# Patient Record
Sex: MALE | Race: OTHER | NOT HISPANIC OR LATINO | URBAN - METROPOLITAN AREA
[De-identification: names, ages, dates, MRNs, and addresses within clinical notes are randomized per-mention and may not be internally consistent; named-entity substitution may affect disease eponyms.]

---

## 2019-02-15 ENCOUNTER — INPATIENT (INPATIENT)
Facility: HOSPITAL | Age: 29
LOS: 8 days | Discharge: HOME | End: 2019-02-24
Attending: HOSPITALIST | Admitting: HOSPITALIST
Payer: MEDICAID

## 2019-02-15 VITALS
TEMPERATURE: 103 F | DIASTOLIC BLOOD PRESSURE: 76 MMHG | OXYGEN SATURATION: 100 % | RESPIRATION RATE: 20 BRPM | HEART RATE: 128 BPM | SYSTOLIC BLOOD PRESSURE: 116 MMHG

## 2019-02-15 DIAGNOSIS — R50.9 FEVER, UNSPECIFIED: ICD-10-CM

## 2019-02-15 DIAGNOSIS — R19.7 DIARRHEA, UNSPECIFIED: ICD-10-CM

## 2019-02-15 DIAGNOSIS — Z29.9 ENCOUNTER FOR PROPHYLACTIC MEASURES, UNSPECIFIED: ICD-10-CM

## 2019-02-15 LAB
ALBUMIN SERPL ELPH-MCNC: 3.9 G/DL — SIGNIFICANT CHANGE UP (ref 3.3–5)
ALP SERPL-CCNC: 158 U/L — HIGH (ref 40–120)
ALT FLD-CCNC: 231 U/L — HIGH (ref 4–41)
ANION GAP SERPL CALC-SCNC: 13 MMO/L — SIGNIFICANT CHANGE UP (ref 7–14)
APPEARANCE UR: CLEAR — SIGNIFICANT CHANGE UP
AST SERPL-CCNC: 91 U/L — HIGH (ref 4–40)
B PERT DNA SPEC QL NAA+PROBE: NOT DETECTED — SIGNIFICANT CHANGE UP
BACTERIA # UR AUTO: NEGATIVE — SIGNIFICANT CHANGE UP
BASE EXCESS BLDV CALC-SCNC: -0.5 MMOL/L — SIGNIFICANT CHANGE UP
BASOPHILS # BLD AUTO: 0.01 K/UL — SIGNIFICANT CHANGE UP (ref 0–0.2)
BASOPHILS NFR BLD AUTO: 0.2 % — SIGNIFICANT CHANGE UP (ref 0–2)
BILIRUB SERPL-MCNC: 0.7 MG/DL — SIGNIFICANT CHANGE UP (ref 0.2–1.2)
BILIRUB UR-MCNC: SIGNIFICANT CHANGE UP
BLOOD GAS VENOUS - CREATININE: 0.93 MG/DL — SIGNIFICANT CHANGE UP (ref 0.5–1.3)
BLOOD UR QL VISUAL: SIGNIFICANT CHANGE UP
BUN SERPL-MCNC: 15 MG/DL — SIGNIFICANT CHANGE UP (ref 7–23)
C PNEUM DNA SPEC QL NAA+PROBE: NOT DETECTED — SIGNIFICANT CHANGE UP
CALCIUM SERPL-MCNC: 9.1 MG/DL — SIGNIFICANT CHANGE UP (ref 8.4–10.5)
CHLORIDE BLDV-SCNC: 107 MMOL/L — SIGNIFICANT CHANGE UP (ref 96–108)
CHLORIDE SERPL-SCNC: 98 MMOL/L — SIGNIFICANT CHANGE UP (ref 98–107)
CO2 SERPL-SCNC: 21 MMOL/L — LOW (ref 22–31)
COLOR SPEC: SIGNIFICANT CHANGE UP
CREAT SERPL-MCNC: 1.17 MG/DL — SIGNIFICANT CHANGE UP (ref 0.5–1.3)
EOSINOPHIL # BLD AUTO: 0 K/UL — SIGNIFICANT CHANGE UP (ref 0–0.5)
EOSINOPHIL NFR BLD AUTO: 0 % — SIGNIFICANT CHANGE UP (ref 0–6)
FLUAV H1 2009 PAND RNA SPEC QL NAA+PROBE: NOT DETECTED — SIGNIFICANT CHANGE UP
FLUAV H1 RNA SPEC QL NAA+PROBE: NOT DETECTED — SIGNIFICANT CHANGE UP
FLUAV H3 RNA SPEC QL NAA+PROBE: NOT DETECTED — SIGNIFICANT CHANGE UP
FLUAV SUBTYP SPEC NAA+PROBE: NOT DETECTED — SIGNIFICANT CHANGE UP
FLUBV RNA SPEC QL NAA+PROBE: NOT DETECTED — SIGNIFICANT CHANGE UP
GAS PNL BLDV: 128 MMOL/L — LOW (ref 136–146)
GLUCOSE BLDV-MCNC: 106 — HIGH (ref 70–99)
GLUCOSE SERPL-MCNC: 108 MG/DL — HIGH (ref 70–99)
GLUCOSE UR-MCNC: NEGATIVE — SIGNIFICANT CHANGE UP
HADV DNA SPEC QL NAA+PROBE: NOT DETECTED — SIGNIFICANT CHANGE UP
HCO3 BLDV-SCNC: 23 MMOL/L — SIGNIFICANT CHANGE UP (ref 20–27)
HCOV PNL SPEC NAA+PROBE: SIGNIFICANT CHANGE UP
HCT VFR BLD CALC: 42.9 % — SIGNIFICANT CHANGE UP (ref 39–50)
HCT VFR BLDV CALC: 43.8 % — SIGNIFICANT CHANGE UP (ref 39–51)
HGB BLD-MCNC: 14.2 G/DL — SIGNIFICANT CHANGE UP (ref 13–17)
HGB BLDV-MCNC: 14.3 G/DL — SIGNIFICANT CHANGE UP (ref 13–17)
HIV COMBO RESULT: SIGNIFICANT CHANGE UP
HIV1+2 AB SPEC QL: SIGNIFICANT CHANGE UP
HMPV RNA SPEC QL NAA+PROBE: NOT DETECTED — SIGNIFICANT CHANGE UP
HPIV1 RNA SPEC QL NAA+PROBE: NOT DETECTED — SIGNIFICANT CHANGE UP
HPIV2 RNA SPEC QL NAA+PROBE: NOT DETECTED — SIGNIFICANT CHANGE UP
HPIV3 RNA SPEC QL NAA+PROBE: NOT DETECTED — SIGNIFICANT CHANGE UP
HPIV4 RNA SPEC QL NAA+PROBE: NOT DETECTED — SIGNIFICANT CHANGE UP
IMM GRANULOCYTES NFR BLD AUTO: 0.6 % — SIGNIFICANT CHANGE UP (ref 0–1.5)
KETONES UR-MCNC: NEGATIVE — SIGNIFICANT CHANGE UP
LACTATE BLDV-MCNC: 1.3 MMOL/L — SIGNIFICANT CHANGE UP (ref 0.5–2)
LEUKOCYTE ESTERASE UR-ACNC: NEGATIVE — SIGNIFICANT CHANGE UP
LYMPHOCYTES # BLD AUTO: 0.86 K/UL — LOW (ref 1–3.3)
LYMPHOCYTES # BLD AUTO: 16.2 % — SIGNIFICANT CHANGE UP (ref 13–44)
MCHC RBC-ENTMCNC: 26.4 PG — LOW (ref 27–34)
MCHC RBC-ENTMCNC: 33.1 % — SIGNIFICANT CHANGE UP (ref 32–36)
MCV RBC AUTO: 79.9 FL — LOW (ref 80–100)
MONOCYTES # BLD AUTO: 0.5 K/UL — SIGNIFICANT CHANGE UP (ref 0–0.9)
MONOCYTES NFR BLD AUTO: 9.4 % — SIGNIFICANT CHANGE UP (ref 2–14)
NEUTROPHILS # BLD AUTO: 3.91 K/UL — SIGNIFICANT CHANGE UP (ref 1.8–7.4)
NEUTROPHILS NFR BLD AUTO: 73.6 % — SIGNIFICANT CHANGE UP (ref 43–77)
NITRITE UR-MCNC: NEGATIVE — SIGNIFICANT CHANGE UP
NRBC # FLD: 0 K/UL — LOW (ref 25–125)
PCO2 BLDV: 35 MMHG — LOW (ref 41–51)
PH BLDV: 7.43 PH — SIGNIFICANT CHANGE UP (ref 7.32–7.43)
PH UR: 6.5 — SIGNIFICANT CHANGE UP (ref 5–8)
PLASMODIUM AG BLD QL: SIGNIFICANT CHANGE UP
PLATELET # BLD AUTO: 281 K/UL — SIGNIFICANT CHANGE UP (ref 150–400)
PMV BLD: 11.9 FL — SIGNIFICANT CHANGE UP (ref 7–13)
PO2 BLDV: 31 MMHG — LOW (ref 35–40)
POTASSIUM BLDV-SCNC: 3.5 MMOL/L — SIGNIFICANT CHANGE UP (ref 3.4–4.5)
POTASSIUM SERPL-MCNC: 3.9 MMOL/L — SIGNIFICANT CHANGE UP (ref 3.5–5.3)
POTASSIUM SERPL-SCNC: 3.9 MMOL/L — SIGNIFICANT CHANGE UP (ref 3.5–5.3)
PROT SERPL-MCNC: 7.8 G/DL — SIGNIFICANT CHANGE UP (ref 6–8.3)
PROT UR-MCNC: 100 — HIGH
RBC # BLD: 5.37 M/UL — SIGNIFICANT CHANGE UP (ref 4.2–5.8)
RBC # FLD: 14 % — SIGNIFICANT CHANGE UP (ref 10.3–14.5)
RBC CASTS # UR COMP ASSIST: SIGNIFICANT CHANGE UP (ref 0–?)
RSV RNA SPEC QL NAA+PROBE: NOT DETECTED — SIGNIFICANT CHANGE UP
RV+EV RNA SPEC QL NAA+PROBE: NOT DETECTED — SIGNIFICANT CHANGE UP
SAO2 % BLDV: 53.1 % — LOW (ref 60–85)
SODIUM SERPL-SCNC: 132 MMOL/L — LOW (ref 135–145)
SP GR SPEC: 1.03 — SIGNIFICANT CHANGE UP (ref 1–1.04)
SQUAMOUS # UR AUTO: SIGNIFICANT CHANGE UP
UROBILINOGEN FLD QL: NORMAL — SIGNIFICANT CHANGE UP
WBC # BLD: 5.31 K/UL — SIGNIFICANT CHANGE UP (ref 3.8–10.5)
WBC # FLD AUTO: 5.31 K/UL — SIGNIFICANT CHANGE UP (ref 3.8–10.5)
WBC UR QL: HIGH (ref 0–?)

## 2019-02-15 PROCEDURE — 71045 X-RAY EXAM CHEST 1 VIEW: CPT | Mod: 26

## 2019-02-15 PROCEDURE — 74177 CT ABD & PELVIS W/CONTRAST: CPT | Mod: 26

## 2019-02-15 PROCEDURE — 71260 CT THORAX DX C+: CPT | Mod: 26

## 2019-02-15 PROCEDURE — 99255 IP/OBS CONSLTJ NEW/EST HI 80: CPT | Mod: GC

## 2019-02-15 PROCEDURE — 99223 1ST HOSP IP/OBS HIGH 75: CPT | Mod: GC

## 2019-02-15 PROCEDURE — 99223 1ST HOSP IP/OBS HIGH 75: CPT | Mod: AI,GC

## 2019-02-15 RX ORDER — ACETAMINOPHEN 500 MG
650 TABLET ORAL ONCE
Qty: 0 | Refills: 0 | Status: COMPLETED | OUTPATIENT
Start: 2019-02-15 | End: 2019-02-15

## 2019-02-15 RX ORDER — IBUPROFEN 200 MG
600 TABLET ORAL ONCE
Qty: 0 | Refills: 0 | Status: COMPLETED | OUTPATIENT
Start: 2019-02-15 | End: 2019-02-15

## 2019-02-15 RX ORDER — PIPERACILLIN AND TAZOBACTAM 4; .5 G/20ML; G/20ML
3.38 INJECTION, POWDER, LYOPHILIZED, FOR SOLUTION INTRAVENOUS ONCE
Qty: 0 | Refills: 0 | Status: COMPLETED | OUTPATIENT
Start: 2019-02-15 | End: 2019-02-15

## 2019-02-15 RX ORDER — ACETAMINOPHEN 500 MG
325 TABLET ORAL ONCE
Qty: 0 | Refills: 0 | Status: COMPLETED | OUTPATIENT
Start: 2019-02-15 | End: 2019-02-15

## 2019-02-15 RX ORDER — ACETAMINOPHEN 500 MG
1000 TABLET ORAL ONCE
Qty: 0 | Refills: 0 | Status: DISCONTINUED | OUTPATIENT
Start: 2019-02-15 | End: 2019-02-15

## 2019-02-15 RX ORDER — PIPERACILLIN AND TAZOBACTAM 4; .5 G/20ML; G/20ML
3.38 INJECTION, POWDER, LYOPHILIZED, FOR SOLUTION INTRAVENOUS EVERY 8 HOURS
Qty: 0 | Refills: 0 | Status: DISCONTINUED | OUTPATIENT
Start: 2019-02-15 | End: 2019-02-19

## 2019-02-15 RX ORDER — INFLUENZA VIRUS VACCINE 15; 15; 15; 15 UG/.5ML; UG/.5ML; UG/.5ML; UG/.5ML
0.5 SUSPENSION INTRAMUSCULAR ONCE
Qty: 0 | Refills: 0 | Status: COMPLETED | OUTPATIENT
Start: 2019-02-15 | End: 2019-02-15

## 2019-02-15 RX ORDER — SODIUM CHLORIDE 9 MG/ML
2000 INJECTION INTRAMUSCULAR; INTRAVENOUS; SUBCUTANEOUS ONCE
Qty: 0 | Refills: 0 | Status: COMPLETED | OUTPATIENT
Start: 2019-02-15 | End: 2019-02-15

## 2019-02-15 RX ADMIN — PIPERACILLIN AND TAZOBACTAM 200 GRAM(S): 4; .5 INJECTION, POWDER, LYOPHILIZED, FOR SOLUTION INTRAVENOUS at 17:06

## 2019-02-15 RX ADMIN — SODIUM CHLORIDE 2000 MILLILITER(S): 9 INJECTION INTRAMUSCULAR; INTRAVENOUS; SUBCUTANEOUS at 13:31

## 2019-02-15 RX ADMIN — Medication 600 MILLIGRAM(S): at 22:09

## 2019-02-15 RX ADMIN — PIPERACILLIN AND TAZOBACTAM 25 GRAM(S): 4; .5 INJECTION, POWDER, LYOPHILIZED, FOR SOLUTION INTRAVENOUS at 22:10

## 2019-02-15 RX ADMIN — Medication 650 MILLIGRAM(S): at 11:31

## 2019-02-15 RX ADMIN — Medication 650 MILLIGRAM(S): at 13:31

## 2019-02-15 RX ADMIN — SODIUM CHLORIDE 2000 MILLILITER(S): 9 INJECTION INTRAMUSCULAR; INTRAVENOUS; SUBCUTANEOUS at 11:31

## 2019-02-15 RX ADMIN — Medication 325 MILLIGRAM(S): at 22:09

## 2019-02-15 NOTE — H&P ADULT - ATTENDING COMMENTS
Pt seen and examined by me Agree with resident   In brief, 27 y/o male with no PMH who presented to the ED w/ Fever of unknown origin  # Sepsis - suspect typhoid fever  other possibilities are acute hepatitis A, intraabdominal abcesses  DW ID Attending - Recommend CT chest/Abdomen/pelvis  Advise Zosyn  FU Infectious work up   # hepatitis B surface Ag positive - as per outpt labs   Will repeat Hepatitis  # Transaminitis- pt denies Abdominal pain/NV   Fu CTAbdomen

## 2019-02-15 NOTE — CONSULT NOTE ADULT - ASSESSMENT
29 yo M no PMH, recently in [place] from 12/26/18 to 2/9/19 presents w fevers. DDx includes malaria, typhoid, dengue, traveler's diarrhea; lower suspicion for TB.     cxr negative  malaria screen neg x 1  ua neg  hiv neg    - check ct c/a/p (w contrast) to r/o abscess, cavitary lesions, other occult foci of infection  - start zosyn 3.375 g IV q8h  - follow up BCx and UCx  - check stool cx, GI PCR, dengue IgM/IgG, malaria screen  - follow up Quant TB, RVP, hepatitis panel  - monitor LFTs, cbc  - monitor fever curve    d/w Dr. Huang and medicine residents

## 2019-02-15 NOTE — ED ADULT TRIAGE NOTE - CHIEF COMPLAINT QUOTE
Pt c/o fever x2 weeks that keeps coming and going.  Was seen by MD in [place] 1/28 and was given medicine and was told he has a liver infection.  Saw MD here 3 days ago and had labs drawn.  Returned from [place] 2/9.  C/O weight loss of 10kg over the past 2 weeks.

## 2019-02-15 NOTE — H&P ADULT - PROBLEM SELECTOR PLAN 1
2 week history of fever from unclear etiology during visit to [place]. History complicated by Diarrhea and intermittent coughing.   - Fever 102.5F and  in ED.  - FUO could be from infection given constellation of symptoms including Hepatitis, HIV, typhoid, GI parasites, TB, etc.  - BCx x3, RVP, UCx, Quant TB test, GI PCR/ova and parasite ordered.  - airborn precaution for now  - CXR clear  - RUQ U/S  - will contact PCP in the AM for immunization history.  - ID consult 2 week history of fever from unclear etiology during visit to [place]. History complicated by Diarrhea and intermittent coughing.   - Fever 102.5F and  in ED.   - monitor VS.  - FUO could be from infection given constellation of symptoms. DDx broad including Hepatitis, HIV, typhoid, shigella, campylobacter, yersinia, aeromonas, listeria, GI parasites, TB, malaria, etc.  - Malaria negative.  - BCx x3, RVP, UCx, Quant TB test, GI PCR/ova and parasite ordered.  - airborn precaution for now  - CXR clear  - RUQ U/S  - will contact PCP in the AM for immunization history.  - ID consult 2 week history of fever from unclear etiology during visit to [facility]. History complicated by Diarrhea and intermittent coughing.   - Fever 102.5F and  in ED.   - monitor VS.  - FUO could be from infection given constellation of symptoms. DDx broad including Hepatitis (Viral and/or EtOH), HIV, typhoid, shigella, campylobacter, yersinia, aeromonas, listeria, GI parasites, TB, malaria, etc.  - Malaria negative. Will repeat Malaria in AM  - Dengue   - BCx x3, RVP, UCx, Quant TB test, GI PCR/ova and parasite ordered.  - airborn precaution for now  - CXR clear  - RUQ U/S to r/o hepatic abscess  - CT Chest and A/P to r/o infectious foci.  - ID consulted  - Started on Zosyn for empiric coverage. 2 week history of fever from unclear etiology during visit to [facility]. History complicated by Diarrhea and intermittent coughing.   - Fever 102.5F and  in ED.   - monitor VS.  - FUO could be from infection given constellation of symptoms. DDx broad including Hepatitis (Viral and/or EtOH), HIV, typhoid, shigella, campylobacter, yersinia, aeromonas, listeria, GI parasites, TB, malaria, etc.  - Malaria negative. Will repeat Malaria in AM  - Dengue   - BCx x3, RVP, UCx, Quant TB test, GI PCR/ova and parasite ordered.  - airborn precaution for now  - CXR clear  - RUQ U/S to r/o hepatic abscess  - CT Chest and A/P to r/o infectious foci.  - ID consulted  - Started on Zosyn for empiric coverage.  - f/u ESR/CRP.

## 2019-02-15 NOTE — H&P ADULT - ASSESSMENT
Mr. Romero is a 27 y/o male with no PMH who presented to the ED w/ Fever of unknown origin. Given his travel history and lab abnormalities, infection is highly likely.

## 2019-02-15 NOTE — H&P ADULT - NSHPPHYSICALEXAM_GEN_ALL_CORE
PHYSICAL EXAM:    Constitutional: NAD. well-developed; well-groomed; well-nourished;  HEENT: AT/NC, PERRLA; EOMI, MMM, no oropharyngeal lesions, no erythema, no exudates, Supple neck; no cervical lymphadenopathy  Respiratory: CTAB. equal aeration bilaterally. no wheezing, no crackes, no rhonchi. no increase in WOB  Cardiovascular: RRR, no M/R/G. 2+ distal pulses. Cap refill ~ 3 seconds. no JVD  Gastrointestinal: soft; NT/ND, +BS, no rebounding tenderness or guarding. Difficult to assess due to musculature but possible hepatomegaly with the tip of liver ~5 cm below the ribs.   Genitourinary: not examined.  Extremities: no clubbing; no cyanosis; no pedal edema, non-tender to palpation, DP and Radial pulses intact.  Skin: warm and dry; color normal: no rash: no ulcers  Neurological: A&Ox 3; responds to pain; responds to verbal commands; epicritic and protopathic sensation intact: CN nerves grossly intact.   MSK/Back: no deformities. Active and passive ROM intact; strength intact, no CVA tenderness, No joint tenderness, swelling, erythema  Psychiatric: normal mood/affect. Denies SI/HI

## 2019-02-15 NOTE — ED PROVIDER NOTE - CLINICAL SUMMARY MEDICAL DECISION MAKING FREE TEXT BOX
- fevers x 2-3 weeks, consider TB, endocarditis, malaria, other infectious disease  - cbc, cmp, malaria screen, acid fast sputum, blood cultures, CXR, UA, urine culture

## 2019-02-15 NOTE — ED PROVIDER NOTE - ATTENDING CONTRIBUTION TO CARE
I performed the initial face to face bedside interview with this patient regarding history of present illness, review of symptoms and past medical, social and family history.  I completed an independent physical examination.  I was the initial provider who evaluated this patient.  The history, review of symptoms and examination was documented by me.   I have signed out the follow up of any pending tests (i.e. labs, radiological studies) to the resident.  I have discussed the patient’s plan of care and disposition with the resident.

## 2019-02-15 NOTE — ED PROVIDER NOTE - OBJECTIVE STATEMENT
28M denies pmh presents with 2-3 weeks of intermittent fevers, 10lb unintentional weight loss, watery diarrhea, and non-productive cough.  Symptoms started while patient was visiting [place].  Went to see a physician there, LFTs elevated to 400s, and was given Augmentin and ursodeoxycholic (started on 2/3/19).  Saw another physician 3 days ago, labs wnl (but no LFTs done).  In 2017, patient had positive HBV surface antigen and core antibody.

## 2019-02-15 NOTE — CONSULT NOTE ADULT - ATTENDING COMMENTS
29 yo M no PMH recent travel to [place] presenting with febrile illness.  Fever, tachycardia  Diarrhea, chills, mild dry cough--no other symptoms noted  Malaria smear neg x 1  Treated with PO augmentin as outpatient in [facility]  Broad differential--travelers diarrhea, shigella, viral illness, liver abscess, as well as other "typical" non-travel associated illnesses  Overall, fever, foreign travel, tachycardia, ? intra-abd infection  - Zosyn 3.375g q 8 empiric  - F/U pending cultures  - Would check CT chest, CT A/P w/ contrast (as can be tolerated)  - Would send dengue, chikungunya, repeat malaria screen, stool culture, stool O+P, GI PCR panel  - F/U pending acute hepatitis panel, RVP, quant gold  - Trend LFTs, CBC, fevers  - Airborne isolation for now, but if CT chest negative for sequelae TB, can likely stop  - Discussed with medicine team    Mj Akins MD  Pager [phone]  After 5pm and on weekends call [phone]    I was physically present for the key portions of the evaluation and management service provided. I saw and examined the patient. I agree with the above history, physical, and plan except for any discrepancies which I have documented in “Attending Attestation.” Please refer to “Attending Attestation” for final plan.

## 2019-02-15 NOTE — CONSULT NOTE ADULT - SUBJECTIVE AND OBJECTIVE BOX
HPI: 29 yo M no PMH, recently in [place] from 18 to 19 presents w fevers. States he started noticing fevers around , associated w chills. Went to physician in [place], was given augmentin w ursodeoxycholic for ?liver infection. No abd imaging was performed in [place]. Told to take for 3 months. Pt states that for past week has been having diarrhea but that has been improving. Has dry cough but no hemoptysis. Denies dysuria, n/v.     No one else has similar symptoms. Some ppl were sick w cough but have improved. Didn't take any typhoid or malaria vaccines. Born in [place]. Never told he has a liver problem.    PAST MEDICAL & SURGICAL HISTORY:  No pertinent past medical history  No significant past surgical history      Allergies    No Known Allergies    Intolerances        ANTIMICROBIALS:  piperacillin/tazobactam IVPB. 3.375 once  piperacillin/tazobactam IVPB. 3.375 every 8 hours      OTHER MEDS:      SOCIAL HISTORY:  Marital Status:  	    Substance Use (street drugs): denies  Tobacco Usage:  denies  Alcohol Usage: drinks 4 shots/day for yrs    FAMILY HISTORY:  Family history of hypertension (Father, Mother)      ROS:  All other systems negative     Constitutional:  fever,  chills  Eye: no eye pain, no redness, no vision changes  ENT:  no sore throat, no rhinorrhea  Cardiovascular:  no chest pain, no palpitation  Respiratory:  no SOB, dry cough  GI:  no abd pain, no vomiting,  diarrhea  urinary: no dysuria, no hematuria, no flank pain  : no  discharge or bleeding  skin:  no rash  neurology:  no headache    Physical Exam:    General:    NAD, non toxic  Head: atraumatic, normocephalic  Eyes: normal sclera and conjunctiva  ENT:    neck supple  Cardio:    regular S1,S2  Respiratory:   clear b/l, no wheezing  abd:   soft, BS +, not tender, no hepatosplenomegaly  :     no manning  Musculoskeletal : no joint swelling, no edema  Skin:    no rash  Neurologic:  no focal deficits  psych: normal affect      Drug Dosing Weight      Vital Signs Last 24 Hrs  T(F): 98.4 (02-15-19 @ 16:29), Max: 102.5 (02-15-19 @ 10:49)    Vital Signs Last 24 Hrs  HR: 98 (02-15-19 @ 16:29) (98 - 128)  BP: 127/67 (02-15-19 @ 16:29) (116/76 - 127/67)  RR: 18 (02-15-19 @ 16:29)  SpO2: 98% (02-15-19 @ 16:29) (98% - 100%)  Wt(kg): --                          14.2   5.31  )-----------( 281      ( 15 Feb 2019 11:50 )             42.9       02-15    132<L>  |  98  |  15  ----------------------------<  108<H>  3.9   |  21<L>  |  1.17    Ca    9.1      15 Feb 2019 11:50    TPro  7.8  /  Alb  3.9  /  TBili  0.7  /  DBili  x   /  AST  91<H>  /  ALT  231<H>  /  AlkPhos  158<H>  02-15      Urinalysis Basic - ( 15 Feb 2019 12:05 )    Color: DARK YELLOW / Appearance: CLEAR / S.028 / pH: 6.5  Gluc: NEGATIVE / Ketone: NEGATIVE  / Bili: TRACE / Urobili: NORMAL   Blood: SMALL / Protein: 100 / Nitrite: NEGATIVE   Leuk Esterase: NEGATIVE / RBC: 3-5 / WBC 6-10   Sq Epi: FEW / Non Sq Epi: x / Bacteria: NEGATIVE        MICROBIOLOGY:  BCx and UCx sent     RADIOLOGY:    CXR- Underinflated but otherwise clear lungs. No pleural effusions or   pneumothorax.    Cardiac and mediastinal silhouettes within normal limits.    Trachea midline.    Unremarkable osseous structures. HPI: 29 yo M no PMH, recently in [place] from 20 to 19 presents w fevers. States he started noticing fevers around -, associated w chills. Went to physician in [place], was given augmentin w ursodeoxycholic for ?liver infection. No abd imaging was performed in [place]. Told to take for 3 months. Pt states that for past week has been having diarrhea but that has been improving. Has dry cough but no hemoptysis. Denies dysuria, n/v.     No one else has similar symptoms. Some ppl were sick w cough but have improved. Didn't take any typhoid or malaria vaccines. Born in [place]. Never told he has a liver problem.    PAST MEDICAL & SURGICAL HISTORY:  No pertinent past medical history  No significant past surgical history    Allergies    No Known Allergies    ANTIMICROBIALS:  piperacillin/tazobactam IVPB. 3.375 once  piperacillin/tazobactam IVPB. 3.375 every 8 hours    OTHER MEDS:      SOCIAL HISTORY:  Marital Status:  	    Substance Use (street drugs): denies  Tobacco Usage:  denies  Alcohol Usage: drinks 4 shots/day for yrs    FAMILY HISTORY:  Family history of hypertension (Father, Mother)    ROS:  All other systems negative     Constitutional:  fever,  chills  Eye: no eye pain, no redness, no vision changes  ENT:  no sore throat, no rhinorrhea  Cardiovascular:  no chest pain, no palpitation  Respiratory:  no SOB, dry cough  GI:  no abd pain, no vomiting,  diarrhea  urinary: no dysuria, no hematuria, no flank pain  : no  discharge or bleeding  skin:  no rash  neurology:  no headache    Physical Exam:    General:    NAD, non toxic  Head: atraumatic, normocephalic  Eyes: normal sclera and conjunctiva  ENT:    neck supple  Cardio:    regular S1,S2  Respiratory:   clear b/l, no wheezing  abd:   soft, BS +, not tender, no hepatosplenomegaly  :     no manning  Musculoskeletal : no joint swelling, no edema  Skin:    no rash  Neurologic:  no focal deficits  psych: normal affect    Drug Dosing Weight  Vital Signs Last 24 Hrs  T(F): 98.4 (02-15-19 @ 16:29), Max: 102.5 (02-15-19 @ 10:49)    Vital Signs Last 24 Hrs  HR: 98 (02-15-19 @ 16:29) (98 - 128)  BP: 127/67 (02-15-19 @ 16:29) (116/76 - 127/67)  RR: 18 (02-15-19 @ 16:29)  SpO2: 98% (02-15-19 @ 16:29) (98% - 100%)    Labs:                        14.2   5.31  )-----------( 281      ( 15 Feb 2019 11:50 )             42.9     02-15    132<L>  |  98  |  15  ----------------------------<  108<H>  3.9   |  21<L>  |  1.17    Ca    9.1      15 Feb 2019 11:50    TPro  7.8  /  Alb  3.9  /  TBili  0.7  /  DBili  x   /  AST  91<H>  /  ALT  231<H>  /  AlkPhos  158<H>  02-15    Urinalysis Basic - ( 15 Feb 2019 12:05 )    Color: DARK YELLOW / Appearance: CLEAR / S.028 / pH: 6.5  Gluc: NEGATIVE / Ketone: NEGATIVE  / Bili: TRACE / Urobili: NORMAL   Blood: SMALL / Protein: 100 / Nitrite: NEGATIVE   Leuk Esterase: NEGATIVE / RBC: 3-5 / WBC 6-10   Sq Epi: FEW / Non Sq Epi: x / Bacteria: NEGATIVE    MICROBIOLOGY:  BCx and UCx sent     HIV neg    Malaria smear negative    RADIOLOGY:    CXR- Underinflated but otherwise clear lungs. No pleural effusions or   pneumothorax.    Cardiac and mediastinal silhouettes within normal limits.    Trachea midline.    Unremarkable osseous structures.

## 2019-02-15 NOTE — ED ADULT NURSE NOTE - NSIMPLEMENTINTERV_GEN_ALL_ED
Implemented All Universal Safety Interventions:  Rex to call system. Call bell, personal items and telephone within reach. Instruct patient to call for assistance. Room bathroom lighting operational. Non-slip footwear when patient is off stretcher. Physically safe environment: no spills, clutter or unnecessary equipment. Stretcher in lowest position, wheels locked, appropriate side rails in place.

## 2019-02-15 NOTE — H&P ADULT - PROBLEM SELECTOR PLAN 2
- DVT ppx: improve score low. Ambulating well. No need for prophylaxis  - Diet: regular diet Hx of Acute diarrhea in setting of fever and starting Augmentin. Now mostly resolved. More formed stool w/ 1x BM per day.  - GI infectious workup as above  - C. diff possible, yet will not get labs given the resolution of diarrhea. Hx of Acute diarrhea in setting of fever and starting Augmentin. Now mostly resolved. More formed stool w/ 1x BM per day.  - Infectious workup as above  - C. diff possible, yet will not get labs given the resolution of diarrhea.

## 2019-02-15 NOTE — ED ADULT NURSE NOTE - OBJECTIVE STATEMENT
received pt to rm 6, Pt c/o fever and cough x2 weeks that keeps coming and going.  Was seen by MD in [place] 1/28 and was given medicine and was told he has a liver infection.  Saw MD here 3 days ago and had labs drawn.  Returned from [facility] 2/9.  C/O weight loss of 10kg over the past 2 weeks, pt A&Ox4, IV access lft ac 20g, labs sent, BC x3 sent, urine samples sent, will continue to monitor.

## 2019-02-15 NOTE — H&P ADULT - NSHPSOCIALHISTORY_GEN_ALL_CORE
Never smoked  Daily alcohol drinker 3~4 shots a day. Has not been drinking since he got sick in early Feb.  Never used recreational drug  Sexually active with recently  wife. (wife in [place])  Lives with sister and her family (, 2 kids)  No pets at home.  Works as a .

## 2019-02-15 NOTE — H&P ADULT - NSHPLABSRESULTS_GEN_ALL_CORE
14.2   5.31  )-----------( 281      ( 15 Feb 2019 11:50 )             42.9       -15    132<L>  |  98  |  15  ----------------------------<  108<H>  3.9   |  21<L>  |  1.17    Ca    9.1      15 Feb 2019 11:50    TPro  7.8  /  Alb  3.9  /  TBili  0.7  /  DBili  x   /  AST  91<H>  /  ALT  231<H>  /  AlkPhos  158<H>  02-15              Urinalysis Basic - ( 15 Feb 2019 12:05 )    Color: DARK YELLOW / Appearance: CLEAR / S.028 / pH: 6.5  Gluc: NEGATIVE / Ketone: NEGATIVE  / Bili: TRACE / Urobili: NORMAL   Blood: SMALL / Protein: 100 / Nitrite: NEGATIVE   Leuk Esterase: NEGATIVE / RBC: 3-5 / WBC 6-10   Sq Epi: FEW / Non Sq Epi: x / Bacteria: NEGATIVE            Lactate Trend            CAPILLARY BLOOD GLUCOSE                RADIOLOGY, EKG & ADDITIONAL TESTS: Reviewed.

## 2019-02-15 NOTE — H&P ADULT - HISTORY OF PRESENT ILLNESS
Mr. Romero is a 29 y/o male with no PMH who presented to the ED w/ persistent fever.    He went to [facility] on 12/26 to visit families. He has been doing well until early Feb when he first noticed fevers. With OTC medications there, the fever did not resolve, and patient went to the physician, who did a workup showing normal CBC, SGOT (AST) 224 and SGPT (ALT) 296. Alk phos was 532. Dengue was negative. He was told by his physician that he has a liver infection and was prescribed with 3 months of Augmentin with ursodeoxycholic. After starting his antibiotics, he started to have diarrhea with his PO intake. There was no associated abdominal pain, n/v. Whenever he ate a meal, he had diarrhea (3~5 a day). The diarrhea was yellow-brown color, watery. When he was not having PO intake, he was not having BM. He had unintentional 10lbs weight loss with the diarrhea. His diarrhea has been resolving and for past couple of days, the frequency has gone down, and yesterday he had 1 loose BM.  He was seen by his PCP 3 days ago in the [facility], and blood works were done. He was instructed that she cannot prescribe any medication without lab resulting back.  He also reports intermittent nonproductive cough that is very minimal.   He denies associated arthralgia, muscle ache, malaise, chest pain, palpitation. SOB, lightheadedness, visual disturbance, dysuria, hematuria.     He denies any sick contact while in [facility], he had food shared among family members yet no one else got sick. No animal exposure. He bought water when outside and had filter at home as water source.   He does not recall his vaccination status, but follows up with his PCP every year for annual physical exam.  He was born in [facility] and came to the [facility] on 2011. No prior hospitalization history.     In ED, he had fever of 102.5 and . /76, RR 20 100% on RA. he was given 2L NS bolus, tylenol x1.

## 2019-02-15 NOTE — H&P ADULT - NSHPREVIEWOFSYSTEMS_GEN_ALL_CORE
CONSTITUTIONAL: Fever and weight loss  EYES: No eye pain, visual disturbances, or discharge  ENMT: No difficulty hearing, tinnitus, vertigo; No sinus or throat pain  RESPIRATORY: rare dry cough, no wheezing, chills or hemoptysis; No shortness of breath  CARDIOVASCULAR: No chest pain, palpitations, dizziness, or leg swelling  GASTROINTESTINAL: Loose watery stool. No abdominal or epigastric pain. No nausea, vomiting, or hematemesis; No melena or hematochezia.  GENITOURINARY: No dysuria, frequency, hematuria, or incontinence  NEUROLOGICAL: No headaches, loss of strength, numbness, or tremors  SKIN: No itching, burning, rashes, or lesions   LYMPH NODES: No enlarged glands  ENDOCRINE: No heat or cold intolerance; No polydipsia or polyuria  MUSCULOSKELETAL: No joint pain or swelling;   PSYCHIATRIC: Denies depression, anxiety  HEME/LYMPH: No easy bruising, or bleeding gums  ALLERGY AND IMMUNOLOGIC: No hives or eczema

## 2019-02-16 LAB
ALBUMIN SERPL ELPH-MCNC: 3.2 G/DL — LOW (ref 3.3–5)
ALP SERPL-CCNC: 133 U/L — HIGH (ref 40–120)
ALT FLD-CCNC: 174 U/L — HIGH (ref 4–41)
ANION GAP SERPL CALC-SCNC: 13 MMO/L — SIGNIFICANT CHANGE UP (ref 7–14)
AST SERPL-CCNC: 71 U/L — HIGH (ref 4–40)
BASOPHILS # BLD AUTO: 0.03 K/UL — SIGNIFICANT CHANGE UP (ref 0–0.2)
BASOPHILS NFR BLD AUTO: 0.7 % — SIGNIFICANT CHANGE UP (ref 0–2)
BILIRUB SERPL-MCNC: 0.4 MG/DL — SIGNIFICANT CHANGE UP (ref 0.2–1.2)
BUN SERPL-MCNC: 15 MG/DL — SIGNIFICANT CHANGE UP (ref 7–23)
CALCIUM SERPL-MCNC: 8.8 MG/DL — SIGNIFICANT CHANGE UP (ref 8.4–10.5)
CHLORIDE SERPL-SCNC: 105 MMOL/L — SIGNIFICANT CHANGE UP (ref 98–107)
CO2 SERPL-SCNC: 22 MMOL/L — SIGNIFICANT CHANGE UP (ref 22–31)
CREAT SERPL-MCNC: 1.01 MG/DL — SIGNIFICANT CHANGE UP (ref 0.5–1.3)
CRP SERPL-MCNC: 56.2 MG/L — HIGH
EOSINOPHIL # BLD AUTO: 0.01 K/UL — SIGNIFICANT CHANGE UP (ref 0–0.5)
EOSINOPHIL NFR BLD AUTO: 0.2 % — SIGNIFICANT CHANGE UP (ref 0–6)
ERYTHROCYTE [SEDIMENTATION RATE] IN BLOOD: 32 MM/HR — HIGH (ref 1–15)
GLUCOSE SERPL-MCNC: 100 MG/DL — HIGH (ref 70–99)
HAV IGM SER-ACNC: NONREACTIVE — SIGNIFICANT CHANGE UP
HBV CORE IGM SER-ACNC: NONREACTIVE — SIGNIFICANT CHANGE UP
HBV SURFACE AG SER-ACNC: REACTIVE — SIGNIFICANT CHANGE UP
HCT VFR BLD CALC: 38.8 % — LOW (ref 39–50)
HCV AB S/CO SERPL IA: 0.12 S/CO — SIGNIFICANT CHANGE UP
HCV AB SERPL-IMP: SIGNIFICANT CHANGE UP
HGB BLD-MCNC: 12.5 G/DL — LOW (ref 13–17)
IMM GRANULOCYTES NFR BLD AUTO: 0.5 % — SIGNIFICANT CHANGE UP (ref 0–1.5)
LYMPHOCYTES # BLD AUTO: 1.31 K/UL — SIGNIFICANT CHANGE UP (ref 1–3.3)
LYMPHOCYTES # BLD AUTO: 30.3 % — SIGNIFICANT CHANGE UP (ref 13–44)
MAGNESIUM SERPL-MCNC: 2 MG/DL — SIGNIFICANT CHANGE UP (ref 1.6–2.6)
MCHC RBC-ENTMCNC: 26.5 PG — LOW (ref 27–34)
MCHC RBC-ENTMCNC: 32.2 % — SIGNIFICANT CHANGE UP (ref 32–36)
MCV RBC AUTO: 82.4 FL — SIGNIFICANT CHANGE UP (ref 80–100)
MONOCYTES # BLD AUTO: 0.44 K/UL — SIGNIFICANT CHANGE UP (ref 0–0.9)
MONOCYTES NFR BLD AUTO: 10.2 % — SIGNIFICANT CHANGE UP (ref 2–14)
NEUTROPHILS # BLD AUTO: 2.51 K/UL — SIGNIFICANT CHANGE UP (ref 1.8–7.4)
NEUTROPHILS NFR BLD AUTO: 58.1 % — SIGNIFICANT CHANGE UP (ref 43–77)
NRBC # FLD: 0 K/UL — LOW (ref 25–125)
PHOSPHATE SERPL-MCNC: 3.8 MG/DL — SIGNIFICANT CHANGE UP (ref 2.5–4.5)
PLASMODIUM AG BLD QL: SIGNIFICANT CHANGE UP
PLATELET # BLD AUTO: 210 K/UL — SIGNIFICANT CHANGE UP (ref 150–400)
PMV BLD: 12.1 FL — SIGNIFICANT CHANGE UP (ref 7–13)
POTASSIUM SERPL-MCNC: 3.8 MMOL/L — SIGNIFICANT CHANGE UP (ref 3.5–5.3)
POTASSIUM SERPL-SCNC: 3.8 MMOL/L — SIGNIFICANT CHANGE UP (ref 3.5–5.3)
PROT SERPL-MCNC: 6.4 G/DL — SIGNIFICANT CHANGE UP (ref 6–8.3)
RBC # BLD: 4.71 M/UL — SIGNIFICANT CHANGE UP (ref 4.2–5.8)
RBC # FLD: 14 % — SIGNIFICANT CHANGE UP (ref 10.3–14.5)
SODIUM SERPL-SCNC: 140 MMOL/L — SIGNIFICANT CHANGE UP (ref 135–145)
SPECIMEN SOURCE: SIGNIFICANT CHANGE UP
WBC # BLD: 4.32 K/UL — SIGNIFICANT CHANGE UP (ref 3.8–10.5)
WBC # FLD AUTO: 4.32 K/UL — SIGNIFICANT CHANGE UP (ref 3.8–10.5)

## 2019-02-16 PROCEDURE — 76705 ECHO EXAM OF ABDOMEN: CPT | Mod: 26

## 2019-02-16 PROCEDURE — 99232 SBSQ HOSP IP/OBS MODERATE 35: CPT

## 2019-02-16 PROCEDURE — 99233 SBSQ HOSP IP/OBS HIGH 50: CPT | Mod: GC

## 2019-02-16 RX ORDER — ACETAMINOPHEN 500 MG
650 TABLET ORAL EVERY 6 HOURS
Qty: 0 | Refills: 0 | Status: DISCONTINUED | OUTPATIENT
Start: 2019-02-16 | End: 2019-02-24

## 2019-02-16 RX ORDER — IBUPROFEN 200 MG
600 TABLET ORAL ONCE
Qty: 0 | Refills: 0 | Status: COMPLETED | OUTPATIENT
Start: 2019-02-16 | End: 2019-02-16

## 2019-02-16 RX ORDER — ACETAMINOPHEN 500 MG
650 TABLET ORAL ONCE
Qty: 0 | Refills: 0 | Status: COMPLETED | OUTPATIENT
Start: 2019-02-16 | End: 2019-02-16

## 2019-02-16 RX ADMIN — PIPERACILLIN AND TAZOBACTAM 25 GRAM(S): 4; .5 INJECTION, POWDER, LYOPHILIZED, FOR SOLUTION INTRAVENOUS at 05:52

## 2019-02-16 RX ADMIN — Medication 260 MILLIGRAM(S): at 01:27

## 2019-02-16 RX ADMIN — PIPERACILLIN AND TAZOBACTAM 25 GRAM(S): 4; .5 INJECTION, POWDER, LYOPHILIZED, FOR SOLUTION INTRAVENOUS at 21:29

## 2019-02-16 RX ADMIN — PIPERACILLIN AND TAZOBACTAM 25 GRAM(S): 4; .5 INJECTION, POWDER, LYOPHILIZED, FOR SOLUTION INTRAVENOUS at 13:21

## 2019-02-16 RX ADMIN — Medication 650 MILLIGRAM(S): at 13:21

## 2019-02-16 RX ADMIN — Medication 600 MILLIGRAM(S): at 00:16

## 2019-02-16 RX ADMIN — Medication 650 MILLIGRAM(S): at 03:20

## 2019-02-16 RX ADMIN — Medication 325 MILLIGRAM(S): at 00:16

## 2019-02-16 RX ADMIN — Medication 600 MILLIGRAM(S): at 21:51

## 2019-02-16 NOTE — PROGRESS NOTE ADULT - SUBJECTIVE AND OBJECTIVE BOX
Follow Up:      Inverval History/ROS:Patient is a 28y old  Male who presents with a chief complaint of persistent Fever (2019 07:09)  Still febrile  Diarrhea better  Deneis dysruia      Allergies    No Known Allergies    Intolerances        ANTIMICROBIALS:  piperacillin/tazobactam IVPB. 3.375 every 8 hours      OTHER MEDS:  acetaminophen   Tablet .. 650 milliGRAM(s) Oral every 6 hours PRN  influenza   Vaccine 0.5 milliLiter(s) IntraMuscular once      Vital Signs Last 24 Hrs  T(C): 37.3 (2019 11:42), Max: 39.2 (2019 00:16)  T(F): 99.1 (2019 11:42), Max: 102.5 (2019 00:16)  HR: 98 (2019 11:42) (78 - 116)  BP: 141/84 (2019 11:42) (124/68 - 141/84)  BP(mean): --  RR: 20 (2019 11:42) (18 - 20)  SpO2: 100% (2019 11:42) (98% - 100%)    PHYSICAL EXAM:  General: [ x] non-toxic  HEAD/EYES: [ ] PERRL [ x] white sclera [ ] icterus  ENT:  [ ] normal [ ] supple [ ] thrush [ ] pharyngeal exudate  Cardiovascular:   [ ] murmur [ x] normal [ ] PPM/AICD  Respiratory:  [x ] clear to ausculation bilaterally  GI:  [x ] soft, non-tender, normal bowel sounds  :  [ ] manning [x ] no CVA tenderness   Musculoskeletal:  [x ] no synovitis  Neurologic:  [ ] non-focal exam   Skin:  [x ] no rash  Lymph: [ ] no lymphadenopathy  Psychiatric:  [ ] appropriate affect [x ] alert & oriented  Lines:  x[ ] no phlebitis [ ] central line                                12.5   4.32  )-----------( 210      ( 2019 06:47 )             38.8       02-16    140  |  105  |  15  ----------------------------<  100<H>  3.8   |  22  |  1.01    Ca    8.8      2019 06:15  Phos  3.8     02-16  Mg     2.0         TPro  6.4  /  Alb  3.2<L>  /  TBili  0.4  /  DBili  x   /  AST  71<H>  /  ALT  174<H>  /  AlkPhos  133<H>        Urinalysis Basic - ( 15 Feb 2019 12:05 )    Color: DARK YELLOW / Appearance: CLEAR / S.028 / pH: 6.5  Gluc: NEGATIVE / Ketone: NEGATIVE  / Bili: TRACE / Urobili: NORMAL   Blood: SMALL / Protein: 100 / Nitrite: NEGATIVE   Leuk Esterase: NEGATIVE / RBC: 3-5 / WBC 6-10   Sq Epi: FEW / Non Sq Epi: x / Bacteria: NEGATIVE        MICROBIOLOGY:  Culture - Urine (02.15.19 @ 12:54)    Culture - Urine:   EC^Escherichia coli  COLONY COUNT: 10,000-49,000 CFU/mL    Specimen Source: URINE MIDSTREAM      RADIOLOGY:  < from: CT Chest w/ IV Cont (02.15.19 @ 18:51) >  IMPRESSION:     Horseshoe kidney. Heterogeneous nephrogram raising concern for   pyelonephritis. Correlation with urinalysis is suggested..    < end of copied text >

## 2019-02-16 NOTE — PROGRESS NOTE ADULT - ASSESSMENT
27 yo M no PMH recent travel to [place] presenting with febrile illness.  Fever, tachycardia  Diarrhea, chills, mild dry cough--no other symptoms noted  Malaria smear neg x 1  Treated with PO augmentin as outpatient in [facility]  Broad differential--travelers diarrhea, shigella, viral illness, liver abscess, as well as other "typical" non-travel associated illnesses  Overall, fever, foreign travel, tachycardia, ? intra-abd infection    1) Diarrhea: await GI PCR    2) Pyelonephritis  Denies urianry sympotms . UA without pyuria  But positive culture and pyelo on CT  Continue zosyn    3) Fever  -Malaria smear engative  - Would send dengue, chikungunya,-  - stool O+P, GI PCR panel  - acute hepatitis panel negative    4) Transaminiits  Raises cocner for viral process  Continue to follow

## 2019-02-16 NOTE — PROGRESS NOTE ADULT - ASSESSMENT
Mr. Romero is a 29 y/o male with no PMH who presented to the ED w/ Fever of unknown origin. Given his travel history and lab abnormalities, infection is highly likely.

## 2019-02-16 NOTE — PROGRESS NOTE ADULT - ATTENDING COMMENTS
Pt seen and examined by me Agree with resident   In brief, 27 y/o male with no PMH who presented to the ED w/ Fever of unknown origin  pt with fever overnight   States he had rigors after urinations, happened twice   # Sepsis - suspect Pyelonephritis  other possibilities are typhoid fever   BC - NGTD x 24 hours, UA - Ecoli FU culture  Acute Hepatitis Panel - negative for Hep A   CT Abdomen - Heterogeneous nephrogram raising concern for pyelonephritis  Fu Malaria, Dengue, stool studies   On Zosyn   ID on board   # Hepatitis B surface Ag positive - likely chronic infection   # Transaminitis- pt denies Abdominal pain/NV

## 2019-02-16 NOTE — PROGRESS NOTE ADULT - SUBJECTIVE AND OBJECTIVE BOX
Luc Hurtado Rosio  PGY-1  Pager: [phone]    Patient is a 28y old  Male who presents with a chief complaint of persistent Fever (15 Feb 2019 16:35)      SUBJECTIVE / OVERNIGHT EVENTS:    MEDICATIONS  (STANDING):  influenza   Vaccine 0.5 milliLiter(s) IntraMuscular once  piperacillin/tazobactam IVPB. 3.375 Gram(s) IV Intermittent every 8 hours    MEDICATIONS  (PRN):      T(C): 36.4 (19 @ 05:52), Max: 39.2 (02-15-19 @ 10:49)  HR: 78 (19 @ 05:52) (78 - 128)  BP: 129/76 (19 @ 05:52) (116/76 - 139/84)  RR: 18 (19 @ 05:52) (14 - 20)  SpO2: 100% (19 @ 05:52) (98% - 100%)  CAPILLARY BLOOD GLUCOSE        I&O's Summary      PHYSICAL EXAM:  GENERAL: NAD, well-developed  HEAD:  Atraumatic, Normocephalic  EYES: EOMI, PERRLA, conjunctiva and sclera clear  NECK: Supple, No JVD  CHEST/LUNG: Clear to auscultation bilaterally; No wheeze  HEART: Regular rate and rhythm; No murmurs, rubs, or gallops  ABDOMEN: Soft, Nontender, Nondistended; Bowel sounds present  EXTREMITIES:  2+ Peripheral Pulses, No clubbing, cyanosis, or edema  PSYCH: AAOx3  NEUROLOGY: non-focal  SKIN: No rashes or lesions    LABS:                        12.5   4.32  )-----------( 210      ( 2019 06:47 )             38.8     02-15    132<L>  |  98  |  15  ----------------------------<  108<H>  3.9   |  21<L>  |  1.17    Ca    9.1      15 Feb 2019 11:50    TPro  7.8  /  Alb  3.9  /  TBili  0.7  /  DBili  x   /  AST  91<H>  /  ALT  231<H>  /  AlkPhos  158<H>  02-15          Urinalysis Basic - ( 15 Feb 2019 12:05 )    Color: DARK YELLOW / Appearance: CLEAR / S.028 / pH: 6.5  Gluc: NEGATIVE / Ketone: NEGATIVE  / Bili: TRACE / Urobili: NORMAL   Blood: SMALL / Protein: 100 / Nitrite: NEGATIVE   Leuk Esterase: NEGATIVE / RBC: 3-5 / WBC 6-10   Sq Epi: FEW / Non Sq Epi: x / Bacteria: NEGATIVE        RADIOLOGY & ADDITIONAL TESTS:    Imaging Personally Reviewed: NA    Consultant(s) Notes Reviewed:  ID    Care Discussed with Consultants/Other Providers: ID Luc Hurtado Rosio  PGY-1  Pager: [phone]    Patient is a 28y old  Male who presents with a chief complaint of persistent Fever (15 Feb 2019 16:35)      SUBJECTIVE / OVERNIGHT EVENTS:  Had fever overnight. 101.8 which improved after motrin and tylenol.   Patient subjectively doing better.  Eating and tolerating PO diet, yet has diarrhea with PO intake. (2x yesterday after lunch and after dinner)    MEDICATIONS  (STANDING):  influenza   Vaccine 0.5 milliLiter(s) IntraMuscular once  piperacillin/tazobactam IVPB. 3.375 Gram(s) IV Intermittent every 8 hours    MEDICATIONS  (PRN):      T(C): 36.4 (19 @ 05:52), Max: 39.2 (02-15-19 @ 10:49)  HR: 78 (19 @ 05:52) (78 - 128)  BP: 129/76 (19 @ 05:52) (116/76 - 139/84)  RR: 18 (19 @ 05:52) (14 - 20)  SpO2: 100% (19 @ 05:52) (98% - 100%)  CAPILLARY BLOOD GLUCOSE        I&O's Summary      PHYSICAL EXAM:  GENERAL: NAD, well-developed  HEAD:  Atraumatic, Normocephalic  EYES: EOMI, PERRLA, conjunctiva and sclera clear  NECK: Supple, No JVD  CHEST/LUNG: Clear to auscultation bilaterally; No wheeze  HEART: Regular rate and rhythm; No murmurs, rubs, or gallops  ABDOMEN: Soft, Nontender, Nondistended; Bowel sounds present  EXTREMITIES:  2+ Peripheral Pulses, No clubbing, cyanosis, or edema  PSYCH: AAOx3  NEUROLOGY: non-focal  SKIN: No rashes or lesions    LABS:                        12.5   4.32  )-----------( 210      ( 2019 06:47 )             38.8     -15    132<L>  |  98  |  15  ----------------------------<  108<H>  3.9   |  21<L>  |  1.17    Ca    9.1      15 Feb 2019 11:50    TPro  7.8  /  Alb  3.9  /  TBili  0.7  /  DBili  x   /  AST  91<H>  /  ALT  231<H>  /  AlkPhos  158<H>  -15          Urinalysis Basic - ( 15 Feb 2019 12:05 )    Color: DARK YELLOW / Appearance: CLEAR / S.028 / pH: 6.5  Gluc: NEGATIVE / Ketone: NEGATIVE  / Bili: TRACE / Urobili: NORMAL   Blood: SMALL / Protein: 100 / Nitrite: NEGATIVE   Leuk Esterase: NEGATIVE / RBC: 3-5 / WBC 6-10   Sq Epi: FEW / Non Sq Epi: x / Bacteria: NEGATIVE        RADIOLOGY & ADDITIONAL TESTS:    Imaging Personally Reviewed: NA    Consultant(s) Notes Reviewed:  ID    Care Discussed with Consultants/Other Providers: ID Luc Hurtado Rosio  PGY-1  Pager: [phone]    Patient is a 28y old  Male who presents with a chief complaint of persistent Fever (15 Feb 2019 16:35)      SUBJECTIVE / OVERNIGHT EVENTS:  Had fever overnight. 101.8 which improved after motrin and tylenol.   Patient subjectively doing better.  Eating and tolerating PO diet, yet has diarrhea with PO intake. (2x yesterday after lunch and after dinner)  He denies CP, palpitation, SOB, dizziness, lightheadedness. visual disturbance.  HE is urinating without dysuria or hematuria. denies frequency changes or incontinence.    MEDICATIONS  (STANDING):  influenza   Vaccine 0.5 milliLiter(s) IntraMuscular once  piperacillin/tazobactam IVPB. 3.375 Gram(s) IV Intermittent every 8 hours    MEDICATIONS  (PRN):      T(C): 36.4 (19 @ 05:52), Max: 39.2 (02-15-19 @ 10:49)  HR: 78 (19 @ 05:52) (78 - 128)  BP: 129/76 (19 @ 05:52) (116/76 - 139/84)  RR: 18 (19 @ 05:52) (14 - 20)  SpO2: 100% (19 @ 05:52) (98% - 100%)  CAPILLARY BLOOD GLUCOSE        I&O's Summary      PHYSICAL EXAM:  GENERAL: NAD, well-developed  HEAD:  Atraumatic, Normocephalic  EYES: EOMI, PERRLA, conjunctiva and sclera clear  NECK: Supple, No JVD  CHEST/LUNG: Clear to auscultation bilaterally; No wheeze  HEART: Regular rate and rhythm; No murmurs, rubs, or gallops  ABDOMEN: Soft, Nontender, Nondistended; Bowel sounds present  EXTREMITIES:  2+ Peripheral Pulses, No clubbing, cyanosis, or edema  PSYCH: AAOx3  NEUROLOGY: non-focal  SKIN: No rashes or lesions    LABS:    Constitutional: NAD. well-developed; well-groomed; well-nourished;  	HEENT: AT/NC, PERRLA; EOMI, MMM, no oropharyngeal lesions, no erythema, no exudates, Supple neck; no cervical lymphadenopathy  	Respiratory: CTAB. equal aeration bilaterally. no wheezing, no crackes, no rhonchi. no increase in WOB  	Cardiovascular: RRR, no M/R/G. 2+ distal pulses. Cap refill ~ 3 seconds. no JVD  	Gastrointestinal: soft; NT/ND, +BS, no rebounding tenderness or guarding. Difficult to assess due to musculature but possible hepatomegaly.   	Genitourinary: not examined.  	Extremities: no clubbing; no cyanosis; no pedal edema, non-tender to palpation, DP and Radial pulses intact.  	Skin: warm and dry; color normal: no rash: no ulcers  	Neurological: A&Ox 3; responds to pain; responds to verbal commands; epicritic and protopathic sensation intact: CN nerves grossly intact.   	MSK/Back: no deformities. Active and passive ROM intact; strength intact, no CVA tenderness, No joint tenderness, swelling, erythema    Psychiatric: normal mood/affect. Denies SI/HI               12.5   4.32  )-----------( 210      ( 2019 06:47 )             38.8     -15    132<L>  |  98  |  15  ----------------------------<  108<H>  3.9   |  21<L>  |  1.17    Ca    9.1      15 Feb 2019 11:50    TPro  7.8  /  Alb  3.9  /  TBili  0.7  /  DBili  x   /  AST  91<H>  /  ALT  231<H>  /  AlkPhos  158<H>  02-15          Urinalysis Basic - ( 15 Feb 2019 12:05 )    Color: DARK YELLOW / Appearance: CLEAR / S.028 / pH: 6.5  Gluc: NEGATIVE / Ketone: NEGATIVE  / Bili: TRACE / Urobili: NORMAL   Blood: SMALL / Protein: 100 / Nitrite: NEGATIVE   Leuk Esterase: NEGATIVE / RBC: 3-5 / WBC 6-10   Sq Epi: FEW / Non Sq Epi: x / Bacteria: NEGATIVE        RADIOLOGY & ADDITIONAL TESTS:    Imaging Personally Reviewed: NA    Consultant(s) Notes Reviewed:  ID    Care Discussed with Consultants/Other Providers: ID

## 2019-02-16 NOTE — PROGRESS NOTE ADULT - PROBLEM SELECTOR PLAN 1
2 week history of fever from unclear etiology during visit to [facility]. History complicated by Diarrhea and intermittent coughing.   - Fever 102.5F and  in ED.   - monitor VS.  - FUO could be from infection given constellation of symptoms. DDx broad including Hepatitis (Viral and/or EtOH), HIV, typhoid, shigella, campylobacter, yersinia, aeromonas, listeria, GI parasites, TB, malaria, etc.  - Malaria negative. Will repeat Malaria in AM  - Dengue   - BCx x3, RVP, UCx, Quant TB test, GI PCR/ova and parasite ordered.  - airborn precaution for now  - CXR clear  - RUQ U/S to r/o hepatic abscess  - CT Chest and A/P to r/o infectious foci.  - ID consulted  - Started on Zosyn for empiric coverage.  - f/u ESR/CRP. 2 week history of fever from unclear etiology during visit to [facility]. History complicated by Diarrhea and intermittent coughing.   - Fever 102.5F and  in ED.   - FUO could be from infection given constellation of symptoms. DDx broad including Hepatitis (Viral and/or EtOH), HIV, typhoid, shigella, campylobacter, yersinia, aeromonas, listeria, GI parasites, TB, malaria, etc.  - UCx positive for E. Coli in midrange in setting of long Augmentin. Patient currently denies any UTI symptoms. Asymptomatic bacteruria.  - Malaria negative. repeat malaria pending  - Dengue pending  - BCx x3, RVP, Quant TB test, GI PCR/ova and parasite pending.  - CXR clear. CT chest clear w/ no signs of TB. removed airborne precaution.  - RUQ U/S pending   - CT Chest and A/P w/o obvious infectious foci. Heterogeneity in kidney with ?pyelo. Yet patient w/ CVA tenderness or Urinary symptoms.  - c/w Zosyn for empiric coverage.  - ID on board. appreciate recs.

## 2019-02-16 NOTE — PROGRESS NOTE ADULT - PROBLEM SELECTOR PLAN 2
Hx of Acute diarrhea in setting of fever and starting Augmentin. Now mostly resolved. More formed stool w/ 1x BM per day.  - Infectious workup as above  - C. diff possible, yet will not get labs given the resolution of diarrhea. Hx of Acute diarrhea in setting of fever and starting Augmentin. Now mostly resolved. More formed stool yet still loose w/ 2x BM per day. Seems to have diarrhea after PO intake (meals).  - Infectious workup as above  - C. diff possible, yet will not get labs given the resolution of diarrhea.  - if he continues to have diarrhea, will get celiac work up and lactose intolerance route. Differential could be broadened to SIBO, IBS, microscopic colitis, chronic pancreatitis (w/ EtOH use), or IBD, though patient lacks abdominal pain. Will continue to follow.

## 2019-02-17 LAB
ALBUMIN SERPL ELPH-MCNC: 3.3 G/DL — SIGNIFICANT CHANGE UP (ref 3.3–5)
ALP SERPL-CCNC: 129 U/L — HIGH (ref 40–120)
ALT FLD-CCNC: 174 U/L — HIGH (ref 4–41)
ANION GAP SERPL CALC-SCNC: 13 MMO/L — SIGNIFICANT CHANGE UP (ref 7–14)
AST SERPL-CCNC: 77 U/L — HIGH (ref 4–40)
BILIRUB SERPL-MCNC: 0.5 MG/DL — SIGNIFICANT CHANGE UP (ref 0.2–1.2)
BUN SERPL-MCNC: 12 MG/DL — SIGNIFICANT CHANGE UP (ref 7–23)
CALCIUM SERPL-MCNC: 8.9 MG/DL — SIGNIFICANT CHANGE UP (ref 8.4–10.5)
CHLORIDE SERPL-SCNC: 103 MMOL/L — SIGNIFICANT CHANGE UP (ref 98–107)
CO2 SERPL-SCNC: 21 MMOL/L — LOW (ref 22–31)
CREAT SERPL-MCNC: 0.91 MG/DL — SIGNIFICANT CHANGE UP (ref 0.5–1.3)
GLUCOSE SERPL-MCNC: 98 MG/DL — SIGNIFICANT CHANGE UP (ref 70–99)
HBV CORE AB SER-ACNC: REACTIVE — SIGNIFICANT CHANGE UP
HBV CORE IGM SER-ACNC: NONREACTIVE — SIGNIFICANT CHANGE UP
HBV SURFACE AB SER-ACNC: NONREACTIVE — SIGNIFICANT CHANGE UP
HCT VFR BLD CALC: 40 % — SIGNIFICANT CHANGE UP (ref 39–50)
HGB BLD-MCNC: 12.7 G/DL — LOW (ref 13–17)
MAGNESIUM SERPL-MCNC: 2 MG/DL — SIGNIFICANT CHANGE UP (ref 1.6–2.6)
MCHC RBC-ENTMCNC: 26.3 PG — LOW (ref 27–34)
MCHC RBC-ENTMCNC: 31.8 % — LOW (ref 32–36)
MCV RBC AUTO: 82.8 FL — SIGNIFICANT CHANGE UP (ref 80–100)
NRBC # FLD: 0 K/UL — LOW (ref 25–125)
PHOSPHATE SERPL-MCNC: 4.6 MG/DL — HIGH (ref 2.5–4.5)
PLATELET # BLD AUTO: 233 K/UL — SIGNIFICANT CHANGE UP (ref 150–400)
PMV BLD: 11.8 FL — SIGNIFICANT CHANGE UP (ref 7–13)
POTASSIUM SERPL-MCNC: 3.7 MMOL/L — SIGNIFICANT CHANGE UP (ref 3.5–5.3)
POTASSIUM SERPL-SCNC: 3.7 MMOL/L — SIGNIFICANT CHANGE UP (ref 3.5–5.3)
PROT SERPL-MCNC: 6.6 G/DL — SIGNIFICANT CHANGE UP (ref 6–8.3)
RBC # BLD: 4.83 M/UL — SIGNIFICANT CHANGE UP (ref 4.2–5.8)
RBC # FLD: 14 % — SIGNIFICANT CHANGE UP (ref 10.3–14.5)
SODIUM SERPL-SCNC: 137 MMOL/L — SIGNIFICANT CHANGE UP (ref 135–145)
WBC # BLD: 5.83 K/UL — SIGNIFICANT CHANGE UP (ref 3.8–10.5)
WBC # FLD AUTO: 5.83 K/UL — SIGNIFICANT CHANGE UP (ref 3.8–10.5)

## 2019-02-17 PROCEDURE — 99233 SBSQ HOSP IP/OBS HIGH 50: CPT | Mod: GC

## 2019-02-17 PROCEDURE — 99232 SBSQ HOSP IP/OBS MODERATE 35: CPT

## 2019-02-17 RX ORDER — GENTAMICIN SULFATE 40 MG/ML
300 VIAL (ML) INJECTION ONCE
Qty: 0 | Refills: 0 | Status: COMPLETED | OUTPATIENT
Start: 2019-02-17 | End: 2019-02-17

## 2019-02-17 RX ADMIN — Medication 650 MILLIGRAM(S): at 13:22

## 2019-02-17 RX ADMIN — PIPERACILLIN AND TAZOBACTAM 25 GRAM(S): 4; .5 INJECTION, POWDER, LYOPHILIZED, FOR SOLUTION INTRAVENOUS at 05:29

## 2019-02-17 RX ADMIN — PIPERACILLIN AND TAZOBACTAM 25 GRAM(S): 4; .5 INJECTION, POWDER, LYOPHILIZED, FOR SOLUTION INTRAVENOUS at 21:32

## 2019-02-17 RX ADMIN — Medication 600 MILLIGRAM(S): at 00:04

## 2019-02-17 RX ADMIN — Medication 650 MILLIGRAM(S): at 14:00

## 2019-02-17 RX ADMIN — Medication 500 MILLIGRAM(S): at 15:28

## 2019-02-17 RX ADMIN — PIPERACILLIN AND TAZOBACTAM 25 GRAM(S): 4; .5 INJECTION, POWDER, LYOPHILIZED, FOR SOLUTION INTRAVENOUS at 12:54

## 2019-02-17 NOTE — PROGRESS NOTE ADULT - ATTENDING COMMENTS
Pt seen and examined by me Agree with resident   In brief, 29 y/o male with no PMH who presented to the ED w/ Fever of unknown origin  pt with fever  upto 104 overnight   States he had rigors after urinations on 2/16 , happened twice   # Sepsis - suspect Pyelonephritis  CT Abdomen - Heterogeneous nephrogram raising concern for pyelonephritis  other possibilities are typhoid fever   BC - NGTD x 24 hours, UA - Ecoli FU culture  DW ID Attending- Appreciate recs- continue Zosyn Plan for gentamycin x1   Acute Hepatitis Panel - negative for Hep A   Fu Malaria, Dengue, chikangunya, stool studies     # Hepatitis B surface Ag positive - likely chronic infection   # Transaminitis- pt denies Abdominal pain/NV   LFTs trending down   Abdominal ultrasound - unremarkable   pt with history of ETOH abuse.  Monitor LFTS

## 2019-02-17 NOTE — PROGRESS NOTE ADULT - PROBLEM SELECTOR PLAN 1
- febrile to 104 (rectal) overnight, p/w 2 week history of fever from unclear etiology during visit to [place]. History complicated by Diarrhea and intermittent coughing.   - FUO could be from infection given constellation of symptoms. DDx broad including Hepatitis (Viral and/or EtOH), HIV, typhoid, shigella, campylobacter, yersinia, aeromonas, listeria, GI parasites, TB, malaria, etc.  - UCx positive for E. Coli in midrange in setting of long Augmentin. Patient currently denies any UTI symptoms. Asymptomatic bacteruria.  - Malaria negative. repeat malaria pending  - Dengue pending  - BCx x3, RVP, Quant TB test, GI PCR/ova and parasite pending.  - CXR clear. CT chest clear w/ no signs of TB. removed airborne precaution.  - RUQ U/S unremarkable   - CT Chest and A/P w/o obvious infectious foci. Heterogeneity in kidney with ?pyelo. Yet patient w/ CVA tenderness or Urinary symptoms.  - c/w Zosyn for empiric coverage.  - ID on board. appreciate recs. - febrile to 104 (rectal) overnight, p/w 2 week history of fever from unclear etiology during visit to [place]. History complicated by Diarrhea and intermittent coughing.   - FUO could be from infection given constellation of symptoms. DDx broad including Hepatitis (Viral and/or EtOH), HIV, typhoid, shigella, campylobacter, yersinia, aeromonas, listeria, GI parasites, TB, malaria, etc.  - UCx positive for E. Coli in midrange in setting of long Augmentin. Patient currently denies any UTI symptoms. Asymptomatic bacteruria.  - Malaria negative. repeat malaria pending  - Dengue pending  - BCx x3, RVP, Quant TB test, GI PCR/ova and parasite pending.  - CXR clear. CT chest clear w/ no signs of TB. removed airborne precaution.  - RUQ U/S unremarkable   - CT Chest and A/P w/o obvious infectious foci. Heterogeneity in kidney with ?pyelo. Yet patient w/ CVA tenderness or Urinary symptoms.  - c/w Zosyn for empiric coverage, pending cultures   - d/w ID, will send EBV and CMV serologies, recommendations appreciated

## 2019-02-17 NOTE — PROGRESS NOTE ADULT - SUBJECTIVE AND OBJECTIVE BOX
Patient is a 28y old  Male who presents with a chief complaint of persistent Fever (2019 18:27)    SUBJECTIVE / OVERNIGHT EVENTS: Febrile to 104 (rectal) overnight. Last episode of diarrhea yesterday. Denies fevers, chills, cp, sob, abdominal pain, n/v, dysuria.     MEDICATIONS  (STANDING):  influenza   Vaccine 0.5 milliLiter(s) IntraMuscular once  piperacillin/tazobactam IVPB. 3.375 Gram(s) IV Intermittent every 8 hours    MEDICATIONS  (PRN):  acetaminophen   Tablet .. 650 milliGRAM(s) Oral every 6 hours PRN Temp greater or equal to 38C (100.4F), Mild Pain (1 - 3), Moderate Pain (4 - 6)    Vital Signs Last 24 Hrs  T(C): 36.4 (2019 05:27), Max: 40 (2019 21:29)  T(F): 97.6 (2019 05:27), Max: 104 (2019 21:29)  HR: 77 (2019 05:27) (77 - 112)  BP: 127/75 (2019 05:27) (124/76 - 150/86)  RR: 18 (2019 05:27) (18 - 20)  SpO2: 100% (2019 05:27) (99% - 100%)  CAPILLARY BLOOD GLUCOSE    I&O's Summary    PHYSICAL EXAM:  GENERAL: NAD, well-developed  HEAD:  Atraumatic, Normocephalic  EYES: EOMI, PERRLA, conjunctiva and sclera clear  NECK: Supple, No JVD  CHEST/LUNG: Clear to auscultation bilaterally; No wheeze  HEART: Regular rate and rhythm; No murmurs, rubs, or gallops  ABDOMEN: Soft, Nontender, Nondistended; Bowel sounds present  EXTREMITIES:  2+ Peripheral Pulses, No clubbing, cyanosis, or edema  PSYCH: AAOx3  NEUROLOGY: non-focal  SKIN: No rashes or lesions    LABS:                        12.7   5.83  )-----------( 233      ( 2019 06:02 )             40.0     02-17    137  |  103  |  12  ----------------------------<  98  3.7   |  21<L>  |  0.91    Ca    8.9      2019 06:02  Phos  4.6       Mg     2.0         TPro  6.6  /  Alb  3.3  /  TBili  0.5  /  DBili  x   /  AST  77<H>  /  ALT  174<H>  /  AlkPhos  129<H>      Urinalysis Basic - ( 15 Feb 2019 12:05 )    Color: DARK YELLOW / Appearance: CLEAR / S.028 / pH: 6.5  Gluc: NEGATIVE / Ketone: NEGATIVE  / Bili: TRACE / Urobili: NORMAL   Blood: SMALL / Protein: 100 / Nitrite: NEGATIVE   Leuk Esterase: NEGATIVE / RBC: 3-5 / WBC 6-10   Sq Epi: FEW / Non Sq Epi: x / Bacteria: NEGATIVE        RADIOLOGY & ADDITIONAL TESTS:    Imaging Personally Reviewed: yes    Consultant(s) Notes Reviewed: yes    Care Discussed with Consultants/Other Providers: yes

## 2019-02-17 NOTE — PROGRESS NOTE ADULT - SUBJECTIVE AND OBJECTIVE BOX
Follow Up:      Inverval History/ROS:Patient is a 28y old  Male who presents with a chief complaint of persistent Fever (17 Feb 2019 11:52)  + fever to 104 last night.  Feels well when he is not febrile.  No abd pain.      Allergies    No Known Allergies    Intolerances        ANTIMICROBIALS:  piperacillin/tazobactam IVPB. 3.375 every 8 hours      OTHER MEDS:  acetaminophen   Tablet .. 650 milliGRAM(s) Oral every 6 hours PRN  influenza   Vaccine 0.5 milliLiter(s) IntraMuscular once      Vital Signs Last 24 Hrs  T(C): 39.3 (17 Feb 2019 13:10), Max: 40 (16 Feb 2019 21:29)  T(F): 102.7 (17 Feb 2019 13:10), Max: 104 (16 Feb 2019 21:29)  HR: 115 (17 Feb 2019 13:10) (77 - 115)  BP: 142/79 (17 Feb 2019 13:10) (124/76 - 150/86)  BP(mean): --  RR: 18 (17 Feb 2019 13:10) (18 - 20)  SpO2: 98% (17 Feb 2019 13:10) (98% - 100%)    PHYSICAL EXAM:  General: [x ] non-toxic  HEAD/EYES: [ ] PERRL [x ] white sclera [ ] icterus  ENT:  [ ] normal [x ] supple [ ] thrush [ ] pharyngeal exudate  Cardiovascular:   [ ] murmur [ x] normal [ ] PPM/AICD  Respiratory:  [x ] clear to ausculation bilaterally  GI:  [ x] soft, non-tender, normal bowel sounds  :  [ ] manning [ x] no CVA tenderness   Musculoskeletal:  [ x] no synovitis  Neurologic:  [x ] non-focal exam   Skin:  [x ] no rash  Lymph: [x ] no lymphadenopathy  Psychiatric:  [ x] appropriate affect [ ] alert & oriented  Lines:  x[ ] no phlebitis [ ] central line                                12.7   5.83  )-----------( 233      ( 17 Feb 2019 06:02 )             40.0       02-17    137  |  103  |  12  ----------------------------<  98  3.7   |  21<L>  |  0.91    Ca    8.9      17 Feb 2019 06:02  Phos  4.6     02-17  Mg     2.0     02-17    TPro  6.6  /  Alb  3.3  /  TBili  0.5  /  DBili  x   /  AST  77<H>  /  ALT  174<H>  /  AlkPhos  129<H>  02-17          MICROBIOLOGY:    RADIOLOGY:

## 2019-02-17 NOTE — PROGRESS NOTE ADULT - PROBLEM SELECTOR PLAN 2
Hx of Acute diarrhea in setting of fever and starting Augmentin. Now mostly resolved. More formed stool yet still loose w/ 2x BM per day. Seems to have diarrhea after PO intake (meals).  - Infectious workup as above  - C. diff possible, yet will not get labs given the resolution of diarrhea.  - if he continues to have diarrhea, will get celiac work up and lactose intolerance route. Differential could be broadened to SIBO, IBS, microscopic colitis, chronic pancreatitis (w/ EtOH use), or IBD, though patient lacks abdominal pain. Will continue to follow.

## 2019-02-17 NOTE — PROGRESS NOTE ADULT - ASSESSMENT
29 yo M no PMH recent travel to [place] presenting with febrile illness.  Fever, tachycardia  Diarrhea, chills, mild dry cough--no other symptoms noted  Malaria smear neg x 1  Treated with PO augmentin as outpatient in [facility]  Broad differential--travelers diarrhea, shigella, viral illness, liver abscess, as well as other "typical" non-travel associated illnesses  Overall, fever, foreign travel, tachycardia, ? intra-abd infection    1) Diarrhea: await GI PCR    2) Pyelonephritis  Denies urianry sympotms . UA without pyuria  But positive culture and pyelo on CT  Culture growing E coli    I called the lab- getting work up for MDR organism  Soteroni tomorrow    gentamicin 300 mg Iv times on e now      3) Fever  -Malaria smear negative  - Would send dengue, chikungunya,-  - stool O+P, GI PCR panel  - acute hepatitis panel negative    4) Transaminitis  Wound not be typical with uti or pyelo  Raises concern for viral process  Continue to follow

## 2019-02-18 LAB
ALBUMIN SERPL ELPH-MCNC: 3.5 G/DL — SIGNIFICANT CHANGE UP (ref 3.3–5)
ALP SERPL-CCNC: 129 U/L — HIGH (ref 40–120)
ALT FLD-CCNC: 186 U/L — HIGH (ref 4–41)
ANION GAP SERPL CALC-SCNC: 14 MMO/L — SIGNIFICANT CHANGE UP (ref 7–14)
AST SERPL-CCNC: 86 U/L — HIGH (ref 4–40)
BILIRUB SERPL-MCNC: 0.3 MG/DL — SIGNIFICANT CHANGE UP (ref 0.2–1.2)
BUN SERPL-MCNC: 9 MG/DL — SIGNIFICANT CHANGE UP (ref 7–23)
CALCIUM SERPL-MCNC: 8.8 MG/DL — SIGNIFICANT CHANGE UP (ref 8.4–10.5)
CHLORIDE SERPL-SCNC: 101 MMOL/L — SIGNIFICANT CHANGE UP (ref 98–107)
CO2 SERPL-SCNC: 21 MMOL/L — LOW (ref 22–31)
CREAT SERPL-MCNC: 0.94 MG/DL — SIGNIFICANT CHANGE UP (ref 0.5–1.3)
EBV EA AB TITR SER IF: POSITIVE — SIGNIFICANT CHANGE UP
EBV EA IGG SER-ACNC: NEGATIVE — SIGNIFICANT CHANGE UP
EBV PATRN SPEC IB-IMP: SIGNIFICANT CHANGE UP
EBV VCA IGG AVIDITY SER QL IA: POSITIVE — SIGNIFICANT CHANGE UP
EBV VCA IGM TITR FLD: NEGATIVE — SIGNIFICANT CHANGE UP
FERRITIN SERPL-MCNC: 406 NG/ML — HIGH (ref 30–400)
GI PCR PANEL, STOOL: SIGNIFICANT CHANGE UP
GLUCOSE SERPL-MCNC: 101 MG/DL — HIGH (ref 70–99)
HCT VFR BLD CALC: 37 % — LOW (ref 39–50)
HGB BLD-MCNC: 12 G/DL — LOW (ref 13–17)
MAGNESIUM SERPL-MCNC: 1.9 MG/DL — SIGNIFICANT CHANGE UP (ref 1.6–2.6)
MCHC RBC-ENTMCNC: 26.4 PG — LOW (ref 27–34)
MCHC RBC-ENTMCNC: 32.4 % — SIGNIFICANT CHANGE UP (ref 32–36)
MCV RBC AUTO: 81.3 FL — SIGNIFICANT CHANGE UP (ref 80–100)
NRBC # FLD: 0 K/UL — LOW (ref 25–125)
PHOSPHATE SERPL-MCNC: 3.8 MG/DL — SIGNIFICANT CHANGE UP (ref 2.5–4.5)
PLATELET # BLD AUTO: 257 K/UL — SIGNIFICANT CHANGE UP (ref 150–400)
PMV BLD: 11.7 FL — SIGNIFICANT CHANGE UP (ref 7–13)
POTASSIUM SERPL-MCNC: 3.9 MMOL/L — SIGNIFICANT CHANGE UP (ref 3.5–5.3)
POTASSIUM SERPL-SCNC: 3.9 MMOL/L — SIGNIFICANT CHANGE UP (ref 3.5–5.3)
PROT SERPL-MCNC: 6.8 G/DL — SIGNIFICANT CHANGE UP (ref 6–8.3)
RBC # BLD: 4.55 M/UL — SIGNIFICANT CHANGE UP (ref 4.2–5.8)
RBC # FLD: 14.1 % — SIGNIFICANT CHANGE UP (ref 10.3–14.5)
SODIUM SERPL-SCNC: 136 MMOL/L — SIGNIFICANT CHANGE UP (ref 135–145)
SPECIMEN SOURCE: SIGNIFICANT CHANGE UP
WBC # BLD: 5.99 K/UL — SIGNIFICANT CHANGE UP (ref 3.8–10.5)
WBC # FLD AUTO: 5.99 K/UL — SIGNIFICANT CHANGE UP (ref 3.8–10.5)

## 2019-02-18 PROCEDURE — 99232 SBSQ HOSP IP/OBS MODERATE 35: CPT | Mod: GC

## 2019-02-18 RX ADMIN — PIPERACILLIN AND TAZOBACTAM 25 GRAM(S): 4; .5 INJECTION, POWDER, LYOPHILIZED, FOR SOLUTION INTRAVENOUS at 21:35

## 2019-02-18 RX ADMIN — PIPERACILLIN AND TAZOBACTAM 25 GRAM(S): 4; .5 INJECTION, POWDER, LYOPHILIZED, FOR SOLUTION INTRAVENOUS at 13:07

## 2019-02-18 RX ADMIN — PIPERACILLIN AND TAZOBACTAM 25 GRAM(S): 4; .5 INJECTION, POWDER, LYOPHILIZED, FOR SOLUTION INTRAVENOUS at 05:24

## 2019-02-18 RX ADMIN — Medication 650 MILLIGRAM(S): at 23:00

## 2019-02-18 RX ADMIN — Medication 650 MILLIGRAM(S): at 22:04

## 2019-02-18 NOTE — PROGRESS NOTE ADULT - ATTENDING COMMENTS
Pt seen and examined by me Agree with resident   In brief, 29 y/o male with no PMH who presented to the ED w/ Fever of unknown origin  Pt with ongoing fever to 102-104 with rigors until yesterday, last fever spike 102.7 in the afternoon  Afebrile since. Pt feels symptomatically better this AM  and asking if he can go home     # Sepsis - suspect Pyelonephritis  CT Abdomen - Heterogeneous nephrogram raising concern for pyelonephritis  other possibilities are typhoid fever   BC - NGTD x 24 hours, UA - Ecoli FU culture  DW ID Attending- Appreciate recs- continue Zosyn  s/p  gentamycin x1 on 2/17   Acute Hepatitis Panel - negative for Hep A   Malaria x 2 negative  FU  Dengue, chikangunya, stool studies     # Hepatitis B surface Ag/AB positive - likely chronic infection   # Transaminitis- pt denies Abdominal pain/NV   Likely in the setting of sepsis    Abdominal ultrasound - unremarkable   pt with history of ETOH abuse.  Monitor LFTS .

## 2019-02-18 NOTE — PROGRESS NOTE ADULT - PROBLEM SELECTOR PLAN 1
- febrile to 104 (rectal) overnight, p/w 2 week history of fever from unclear etiology during visit to [place]. History complicated by Diarrhea and intermittent coughing.   - FUO could be from infection given constellation of symptoms. DDx broad including Hepatitis (Viral and/or EtOH), HIV, typhoid, shigella, campylobacter, yersinia, aeromonas, listeria, GI parasites, TB, malaria, etc.  - UCx positive for E. Coli in midrange in setting of long Augmentin. Patient currently denies any UTI symptoms. Asymptomatic bacteruria.  - Malaria negative. repeat malaria pending  - Dengue pending  - BCx x3, RVP, Quant TB test, GI PCR/ova and parasite pending.  - CXR clear. CT chest clear w/ no signs of TB. removed airborne precaution.  - RUQ U/S unremarkable   - CT Chest and A/P w/o obvious infectious foci. Heterogeneity in kidney with ?pyelo. Yet patient w/ CVA tenderness or Urinary symptoms.  - c/w Zosyn for empiric coverage, pending cultures   - d/w ID, will send EBV and CMV serologies, recommendations appreciated 2 week history of fever from unclear etiology during visit to [place]. History complicated by Diarrhea and intermittent coughing.   - FUO could be from infection given constellation of symptoms. DDx broad including Hepatitis (Viral and/or EtOH), HIV, typhoid, shigella, campylobacter, yersinia, aeromonas, listeria, GI parasites, TB, malaria, etc.  - UCx positive for E. Coli in midrange in setting of long Augmentin. Patient currently denies any UTI symptoms. Asymptomatic bacteruria.  - Malaria negative x2,   - Dengue pending  - BCx x3, RVP, Quant TB test, GI PCR/ova and parasite pending.   - EBV, CMV, Chikungunya pending  - CXR clear. CT chest clear w/ no signs of TB. removed airborne precaution.  - RUQ U/S unremarkable   - CT Chest and A/P w/o obvious infectious foci. Heterogeneity in kidney with ?pyelo. Yet patient w/ CVA tenderness or Urinary symptoms.  - c/w Zosyn for empiric coverage, s/p 1x gentamycin (2/17)   - ID on board

## 2019-02-18 NOTE — PROGRESS NOTE ADULT - SUBJECTIVE AND OBJECTIVE BOX
Luc Hurtado Rosio  PGY-1  Pager: [phone]    Patient is a 28y old  Male who presents with a chief complaint of persistent Fever (17 Feb 2019 13:49)      SUBJECTIVE / OVERNIGHT EVENTS:    MEDICATIONS  (STANDING):  influenza   Vaccine 0.5 milliLiter(s) IntraMuscular once  piperacillin/tazobactam IVPB. 3.375 Gram(s) IV Intermittent every 8 hours    MEDICATIONS  (PRN):  acetaminophen   Tablet .. 650 milliGRAM(s) Oral every 6 hours PRN Temp greater or equal to 38C (100.4F), Mild Pain (1 - 3), Moderate Pain (4 - 6)      T(C): 37.3 (02-18-19 @ 05:21), Max: 39.3 (02-17-19 @ 13:10)  HR: 89 (02-18-19 @ 05:21) (89 - 115)  BP: 127/86 (02-18-19 @ 05:21) (127/86 - 144/80)  RR: 18 (02-18-19 @ 05:21) (18 - 18)  SpO2: 100% (02-18-19 @ 05:21) (98% - 100%)  CAPILLARY BLOOD GLUCOSE        I&O's Summary      PHYSICAL EXAM:  GENERAL: NAD, well-developed  HEAD:  Atraumatic, Normocephalic  EYES: EOMI, PERRLA, conjunctiva and sclera clear  NECK: Supple, No JVD  CHEST/LUNG: Clear to auscultation bilaterally; No wheeze  HEART: Regular rate and rhythm; No murmurs, rubs, or gallops  ABDOMEN: Soft, Nontender, Nondistended; Bowel sounds present  EXTREMITIES:  2+ Peripheral Pulses, No clubbing, cyanosis, or edema  PSYCH: AAOx3  NEUROLOGY: non-focal  SKIN: No rashes or lesions    LABS:                        12.0   5.99  )-----------( 257      ( 18 Feb 2019 05:18 )             37.0     02-18    136  |  101  |  9   ----------------------------<  101<H>  3.9   |  21<L>  |  0.94    Ca    8.8      18 Feb 2019 05:18  Phos  3.8     02-18  Mg     1.9     02-18    TPro  6.8  /  Alb  3.5  /  TBili  0.3  /  DBili  x   /  AST  86<H>  /  ALT  186<H>  /  AlkPhos  129<H>  02-18              RADIOLOGY & ADDITIONAL TESTS:    Imaging Personally Reviewed:    Consultant(s) Notes Reviewed:      Care Discussed with Consultants/Other Providers: Luc Avelar  PGY-1  Pager: [phone]    Patient is a 28y old  Male who presents with a chief complaint of persistent Fever (17 Feb 2019 13:49)      SUBJECTIVE / OVERNIGHT EVENTS:  No acute events overnight.  Patient received one dose of gentamycin yesterday.  Patient eating well and ambulating. good UOP and BM normal.  Denies CP, palpitation, SOB, dizziness, headache, visual disturbance.      MEDICATIONS  (STANDING):  influenza   Vaccine 0.5 milliLiter(s) IntraMuscular once  piperacillin/tazobactam IVPB. 3.375 Gram(s) IV Intermittent every 8 hours    MEDICATIONS  (PRN):  acetaminophen   Tablet .. 650 milliGRAM(s) Oral every 6 hours PRN Temp greater or equal to 38C (100.4F), Mild Pain (1 - 3), Moderate Pain (4 - 6)      T(C): 37.3 (02-18-19 @ 05:21), Max: 39.3 (02-17-19 @ 13:10)  HR: 89 (02-18-19 @ 05:21) (89 - 115)  BP: 127/86 (02-18-19 @ 05:21) (127/86 - 144/80)  RR: 18 (02-18-19 @ 05:21) (18 - 18)  SpO2: 100% (02-18-19 @ 05:21) (98% - 100%)  CAPILLARY BLOOD GLUCOSE        I&O's Summary      PHYSICAL EXAM:    Constitutional: NAD. well-developed; well-groomed; well-nourished;  	HEENT: AT/NC, PERRLA; EOMI, MMM, no oropharyngeal lesions, no erythema, no exudates, Supple neck; no cervical lymphadenopathy  	Respiratory: CTAB. equal aeration bilaterally. no wheezing, no crackes, no rhonchi. no increase in WOB  	Cardiovascular: RRR, no M/R/G. 2+ distal pulses. Cap refill ~ 3 seconds. no JVD  	Gastrointestinal: soft; NT/ND, +BS, no rebounding tenderness or guarding. Difficult to assess due to musculature but possible hepatomegaly.   	Genitourinary: not examined.  	Extremities: no clubbing; no cyanosis; no pedal edema, non-tender to palpation, DP and Radial pulses intact.  	Skin: warm and dry; color normal: no rash: no ulcers  	Neurological: A&Ox 3; responds to pain; responds to verbal commands; epicritic and protopathic sensation intact: CN nerves grossly intact.   	MSK/Back: no deformities. Active and passive ROM intact; strength intact, no CVA tenderness, No joint tenderness, swelling, erythema    Psychiatric: normal mood/affect. Denies SI/HI      LABS:                        12.0   5.99  )-----------( 257      ( 18 Feb 2019 05:18 )             37.0     02-18    136  |  101  |  9   ----------------------------<  101<H>  3.9   |  21<L>  |  0.94    Ca    8.8      18 Feb 2019 05:18  Phos  3.8     02-18  Mg     1.9     02-18    TPro  6.8  /  Alb  3.5  /  TBili  0.3  /  DBili  x   /  AST  86<H>  /  ALT  186<H>  /  AlkPhos  129<H>  02-18              RADIOLOGY & ADDITIONAL TESTS:    Imaging Personally Reviewed:    Consultant(s) Notes Reviewed:      Care Discussed with Consultants/Other Providers:

## 2019-02-19 LAB
ALBUMIN SERPL ELPH-MCNC: 3.6 G/DL — SIGNIFICANT CHANGE UP (ref 3.3–5)
ALP SERPL-CCNC: 128 U/L — HIGH (ref 40–120)
ALT FLD-CCNC: 214 U/L — HIGH (ref 4–41)
ANION GAP SERPL CALC-SCNC: 12 MMO/L — SIGNIFICANT CHANGE UP (ref 7–14)
AST SERPL-CCNC: 83 U/L — HIGH (ref 4–40)
BILIRUB SERPL-MCNC: 0.4 MG/DL — SIGNIFICANT CHANGE UP (ref 0.2–1.2)
BUN SERPL-MCNC: 10 MG/DL — SIGNIFICANT CHANGE UP (ref 7–23)
CALCIUM SERPL-MCNC: 9.3 MG/DL — SIGNIFICANT CHANGE UP (ref 8.4–10.5)
CHLORIDE SERPL-SCNC: 104 MMOL/L — SIGNIFICANT CHANGE UP (ref 98–107)
CMV DNA CSF QL NAA+PROBE: NOT DETECTED — SIGNIFICANT CHANGE UP
CMV DNA SPEC NAA+PROBE-LOG#: SIGNIFICANT CHANGE UP LOGIU/ML
CO2 SERPL-SCNC: 22 MMOL/L — SIGNIFICANT CHANGE UP (ref 22–31)
CREAT SERPL-MCNC: 0.84 MG/DL — SIGNIFICANT CHANGE UP (ref 0.5–1.3)
GAMMA INTERFERON BACKGROUND BLD IA-ACNC: 1.98 IU/ML — SIGNIFICANT CHANGE UP
GLUCOSE SERPL-MCNC: 106 MG/DL — HIGH (ref 70–99)
HCT VFR BLD CALC: 43.9 % — SIGNIFICANT CHANGE UP (ref 39–50)
HGB BLD-MCNC: 13.8 G/DL — SIGNIFICANT CHANGE UP (ref 13–17)
M TB IFN-G BLD-IMP: NEGATIVE — SIGNIFICANT CHANGE UP
M TB IFN-G CD4+ BCKGRND COR BLD-ACNC: 0.23 IU/ML — SIGNIFICANT CHANGE UP
M TB IFN-G CD4+CD8+ BCKGRND COR BLD-ACNC: 0.21 IU/ML — SIGNIFICANT CHANGE UP
MAGNESIUM SERPL-MCNC: 2.1 MG/DL — SIGNIFICANT CHANGE UP (ref 1.6–2.6)
MCHC RBC-ENTMCNC: 26.3 PG — LOW (ref 27–34)
MCHC RBC-ENTMCNC: 31.4 % — LOW (ref 32–36)
MCV RBC AUTO: 83.8 FL — SIGNIFICANT CHANGE UP (ref 80–100)
METHOD TYPE: SIGNIFICANT CHANGE UP
NRBC # FLD: 0 K/UL — LOW (ref 25–125)
O+P SPEC CONC: SIGNIFICANT CHANGE UP
ORGANISM # SPEC MICROSCOPIC CNT: SIGNIFICANT CHANGE UP
PHOSPHATE SERPL-MCNC: 3.6 MG/DL — SIGNIFICANT CHANGE UP (ref 2.5–4.5)
PLATELET # BLD AUTO: 261 K/UL — SIGNIFICANT CHANGE UP (ref 150–400)
PMV BLD: 11.3 FL — SIGNIFICANT CHANGE UP (ref 7–13)
POTASSIUM SERPL-MCNC: 4.1 MMOL/L — SIGNIFICANT CHANGE UP (ref 3.5–5.3)
POTASSIUM SERPL-SCNC: 4.1 MMOL/L — SIGNIFICANT CHANGE UP (ref 3.5–5.3)
PROT SERPL-MCNC: 7.6 G/DL — SIGNIFICANT CHANGE UP (ref 6–8.3)
QUANT TB PLUS MITOGEN MINUS NIL: 4.33 IU/ML — SIGNIFICANT CHANGE UP
RBC # BLD: 5.24 M/UL — SIGNIFICANT CHANGE UP (ref 4.2–5.8)
RBC # FLD: 14 % — SIGNIFICANT CHANGE UP (ref 10.3–14.5)
SODIUM SERPL-SCNC: 138 MMOL/L — SIGNIFICANT CHANGE UP (ref 135–145)
SPECIMEN SOURCE: SIGNIFICANT CHANGE UP
WBC # BLD: 6.32 K/UL — SIGNIFICANT CHANGE UP (ref 3.8–10.5)
WBC # FLD AUTO: 6.32 K/UL — SIGNIFICANT CHANGE UP (ref 3.8–10.5)

## 2019-02-19 PROCEDURE — 99233 SBSQ HOSP IP/OBS HIGH 50: CPT

## 2019-02-19 PROCEDURE — 99233 SBSQ HOSP IP/OBS HIGH 50: CPT | Mod: GC

## 2019-02-19 RX ORDER — AZITHROMYCIN 500 MG/1
500 TABLET, FILM COATED ORAL EVERY 24 HOURS
Qty: 0 | Refills: 0 | Status: COMPLETED | OUTPATIENT
Start: 2019-02-19 | End: 2019-02-21

## 2019-02-19 RX ORDER — POLYMYXIN B SULFATE 500000 [USP'U]/1
600000 INJECTION, POWDER, LYOPHILIZED, FOR SOLUTION INTRAMUSCULAR; INTRATHECAL; INTRAVENOUS; OPHTHALMIC EVERY 12 HOURS
Qty: 0 | Refills: 0 | Status: DISCONTINUED | OUTPATIENT
Start: 2019-02-19 | End: 2019-02-20

## 2019-02-19 RX ADMIN — PIPERACILLIN AND TAZOBACTAM 25 GRAM(S): 4; .5 INJECTION, POWDER, LYOPHILIZED, FOR SOLUTION INTRAVENOUS at 13:00

## 2019-02-19 RX ADMIN — PIPERACILLIN AND TAZOBACTAM 25 GRAM(S): 4; .5 INJECTION, POWDER, LYOPHILIZED, FOR SOLUTION INTRAVENOUS at 05:25

## 2019-02-19 RX ADMIN — AZITHROMYCIN 500 MILLIGRAM(S): 500 TABLET, FILM COATED ORAL at 16:18

## 2019-02-19 RX ADMIN — POLYMYXIN B SULFATE 500 UNIT(S): 500000 INJECTION, POWDER, LYOPHILIZED, FOR SOLUTION INTRAMUSCULAR; INTRATHECAL; INTRAVENOUS; OPHTHALMIC at 16:18

## 2019-02-19 NOTE — PROGRESS NOTE ADULT - PROBLEM SELECTOR PLAN 2
Hx of Acute diarrhea in setting of fever and starting Augmentin. Now mostly resolved. More formed stool yet still loose w/ 2x BM per day. Seems to have diarrhea after PO intake (meals).  - Infectious workup as above  - C. diff possible, yet will not get labs given the resolution of diarrhea.  - if he continues to have diarrhea, will get celiac work up and lactose intolerance route. Differential could be broadened to SIBO, IBS, microscopic colitis, chronic pancreatitis (w/ EtOH use), or IBD, though patient lacks abdominal pain. Will continue to follow. Hx of Acute diarrhea in setting of fever and starting Augmentin. Now mostly resolved. More formed stool yet still loose w/ 2x BM per day. Seems to have diarrhea after PO intake (meals).  - Infectious workup as above  - C. diff possible, yet will not get labs given the resolution of diarrhea.  - if he continues to have diarrhea associated w/ food, will get celiac work up and lactose intolerance route. Differential could be broadened to SIBO, IBS, microscopic colitis, chronic pancreatitis (w/ EtOH use), or IBD, though patient lacks abdominal pain. Hx of Acute diarrhea in setting of fever and starting Augmentin. Now mostly resolved. More formed stool yet still loose w/ 2x BM per day. Seems to have diarrhea after PO intake (meals) likely in the setting of Salmonella and Campylobacter infection  -Azithromycin 500mg po Qdaily x 3 days as per ID

## 2019-02-19 NOTE — PROGRESS NOTE ADULT - SUBJECTIVE AND OBJECTIVE BOX
Luc Hurtado Rosio  PGY-1  Pager: [phone]    Patient is a 28y old  Male who presents with a chief complaint of persistent Fever (18 Feb 2019 07:16)      SUBJECTIVE / OVERNIGHT EVENTS:    MEDICATIONS  (STANDING):  influenza   Vaccine 0.5 milliLiter(s) IntraMuscular once  piperacillin/tazobactam IVPB. 3.375 Gram(s) IV Intermittent every 8 hours    MEDICATIONS  (PRN):  acetaminophen   Tablet .. 650 milliGRAM(s) Oral every 6 hours PRN Temp greater or equal to 38C (100.4F), Mild Pain (1 - 3), Moderate Pain (4 - 6)      T(C): 36.6 (02-19-19 @ 05:24), Max: 39.2 (02-18-19 @ 21:44)  HR: 80 (02-19-19 @ 05:24) (80 - 108)  BP: 133/83 (02-19-19 @ 05:24) (133/83 - 137/75)  RR: 18 (02-19-19 @ 05:24) (18 - 18)  SpO2: 100% (02-19-19 @ 05:24) (98% - 100%)  CAPILLARY BLOOD GLUCOSE        I&O's Summary      PHYSICAL EXAM:  GENERAL: NAD, well-developed  HEAD:  Atraumatic, Normocephalic  EYES: EOMI, PERRLA, conjunctiva and sclera clear  NECK: Supple, No JVD  CHEST/LUNG: Clear to auscultation bilaterally; No wheeze  HEART: Regular rate and rhythm; No murmurs, rubs, or gallops  ABDOMEN: Soft, Nontender, Nondistended; Bowel sounds present  EXTREMITIES:  2+ Peripheral Pulses, No clubbing, cyanosis, or edema  PSYCH: AAOx3  NEUROLOGY: non-focal  SKIN: No rashes or lesions    LABS:                        12.0   5.99  )-----------( 257      ( 18 Feb 2019 05:18 )             37.0     02-18    136  |  101  |  9   ----------------------------<  101<H>  3.9   |  21<L>  |  0.94    Ca    8.8      18 Feb 2019 05:18  Phos  3.8     02-18  Mg     1.9     02-18    TPro  6.8  /  Alb  3.5  /  TBili  0.3  /  DBili  x   /  AST  86<H>  /  ALT  186<H>  /  AlkPhos  129<H>  02-18              RADIOLOGY & ADDITIONAL TESTS:    Imaging Personally Reviewed:    Consultant(s) Notes Reviewed:      Care Discussed with Consultants/Other Providers: Luc Avelar  PGY-1  Pager: [phone]    Patient is a 28y old  Male who presents with a chief complaint of persistent Fever (18 Feb 2019 07:16)      SUBJECTIVE / OVERNIGHT EVENTS:  Fever last night 102.5 (21:44) and 101.4 (2300).  Otherwise patient feels about the same as yesterday subjectively.  He denies chilld, rigors, SOB, CP, palpitation, headache, lightheadedness, visual disturbance.  He is eating well and having good BM w/ more formed stool and urinating well. Ambulating when he gets a chance.      MEDICATIONS  (STANDING):  influenza   Vaccine 0.5 milliLiter(s) IntraMuscular once  piperacillin/tazobactam IVPB. 3.375 Gram(s) IV Intermittent every 8 hours    MEDICATIONS  (PRN):  acetaminophen   Tablet .. 650 milliGRAM(s) Oral every 6 hours PRN Temp greater or equal to 38C (100.4F), Mild Pain (1 - 3), Moderate Pain (4 - 6)      T(C): 36.6 (02-19-19 @ 05:24), Max: 39.2 (02-18-19 @ 21:44)  HR: 80 (02-19-19 @ 05:24) (80 - 108)  BP: 133/83 (02-19-19 @ 05:24) (133/83 - 137/75)  RR: 18 (02-19-19 @ 05:24) (18 - 18)  SpO2: 100% (02-19-19 @ 05:24) (98% - 100%)  CAPILLARY BLOOD GLUCOSE        I&O's Summary      PHYSICAL EXAM:  Constitutional: NAD. well-developed; well-groomed; well-nourished;  	HEENT: AT/NC, PERRLA; EOMI, MMM, no oropharyngeal lesions, no erythema, no exudates,   	Respiratory: CTAB. equal aeration bilaterally. no wheezing, no crackes, no rhonchi. no increase in WOB  	Cardiovascular: RRR, no M/R/G. 2+ distal pulses. Cap refill ~ 3 seconds. no JVD  	Gastrointestinal: soft; NT/ND, +BS, no rebounding tenderness or guarding. Difficult to assess due to musculature but possible hepatomegaly.   	Genitourinary: not examined.  	Extremities: no clubbing; no cyanosis; no pedal edema, non-tender to palpation, DP and Radial pulses intact.  	Skin: warm and dry; color normal: no rash: no ulcers  	Neurological: A&Ox 3; responds to pain; responds to verbal commands; epicritic and protopathic sensation intact: CN nerves grossly intact.   	MSK/Back: no deformities. Active and passive ROM intact; strength intact, no CVA tenderness, No joint tenderness, swelling, erythema    Psychiatric: normal mood/affect. Denies SI/HI      LABS:                        12.0   5.99  )-----------( 257      ( 18 Feb 2019 05:18 )             37.0     02-18    136  |  101  |  9   ----------------------------<  101<H>  3.9   |  21<L>  |  0.94    Ca    8.8      18 Feb 2019 05:18  Phos  3.8     02-18  Mg     1.9     02-18    TPro  6.8  /  Alb  3.5  /  TBili  0.3  /  DBili  x   /  AST  86<H>  /  ALT  186<H>  /  AlkPhos  129<H>  02-18              RADIOLOGY & ADDITIONAL TESTS:    Imaging Personally Reviewed: SANDRA    Consultant(s) Notes Reviewed:  ID    Care Discussed with Consultants/Other Providers: SANDRA Luc Avelar  PGY-1  Pager: [phone]    Patient is a 28y old  Male who presents with a chief complaint of persistent Fever (18 Feb 2019 07:16)      SUBJECTIVE / OVERNIGHT EVENTS:  Fever last night 102.5 (21:44) and 101.4 (2300).  Otherwise patient feels about the same as yesterday subjectively.  He denies chilld, rigors, SOB, CP, palpitation, headache, lightheadedness, visual disturbance.  He is eating well and having good BM w/ more formed stool and urinating well. Ambulating when he gets a chance.      MEDICATIONS  (STANDING):  influenza   Vaccine 0.5 milliLiter(s) IntraMuscular once  piperacillin/tazobactam IVPB. 3.375 Gram(s) IV Intermittent every 8 hours    MEDICATIONS  (PRN):  acetaminophen   Tablet .. 650 milliGRAM(s) Oral every 6 hours PRN Temp greater or equal to 38C (100.4F), Mild Pain (1 - 3), Moderate Pain (4 - 6)      T(C): 36.6 (02-19-19 @ 05:24), Max: 39.2 (02-18-19 @ 21:44)  HR: 80 (02-19-19 @ 05:24) (80 - 108)  BP: 133/83 (02-19-19 @ 05:24) (133/83 - 137/75)  RR: 18 (02-19-19 @ 05:24) (18 - 18)  SpO2: 100% (02-19-19 @ 05:24) (98% - 100%)  CAPILLARY BLOOD GLUCOSE        I&O's Summary      PHYSICAL EXAM:  Constitutional: NAD. well-developed; well-groomed; well-nourished;  	HEENT: AT/NC, PERRLA; EOMI, MMM, no oropharyngeal lesions, no erythema, no exudates,   	Respiratory: CTAB. equal aeration bilaterally. no wheezing, no crackes, no rhonchi. no increase in WOB  	Cardiovascular: RRR, no M/R/G. 2+ distal pulses. Cap refill ~ 3 seconds. no JVD  	Gastrointestinal: soft; NT/ND, +BS, no rebounding tenderness or guarding.   	Genitourinary: not examined.  	Extremities: no clubbing; no cyanosis; no pedal edema, non-tender to palpation, DP and Radial pulses intact.  	Skin: warm and dry; color normal: no rash: no ulcers  	Neurological: A&Ox 3; responds to pain; responds to verbal commands; epicritic and protopathic sensation intact: CN nerves grossly intact.   	MSK/Back: no deformities. Active and passive ROM intact; strength intact, no CVA tenderness, No joint tenderness, swelling, erythema    Psychiatric: normal mood/affect. Denies SI/HI      LABS:                        12.0   5.99  )-----------( 257      ( 18 Feb 2019 05:18 )             37.0     02-18    136  |  101  |  9   ----------------------------<  101<H>  3.9   |  21<L>  |  0.94    Ca    8.8      18 Feb 2019 05:18  Phos  3.8     02-18  Mg     1.9     02-18    TPro  6.8  /  Alb  3.5  /  TBili  0.3  /  DBili  x   /  AST  86<H>  /  ALT  186<H>  /  AlkPhos  129<H>  02-18              RADIOLOGY & ADDITIONAL TESTS:    Imaging Personally Reviewed: SANDRA    Consultant(s) Notes Reviewed:  ID    Care Discussed with Consultants/Other Providers: SANDRA Luc Avelar  PGY-1  Pager: [phone]    Patient is a 28y old  Male who presents with a chief complaint of persistent Fever (18 Feb 2019 07:16)      SUBJECTIVE / OVERNIGHT EVENTS:  Fever last night 102.5 (21:44) and 101.4 (2300).  Otherwise patient feels about the same as yesterday subjectively.  He denies chilld, rigors, SOB, CP, palpitation, headache, lightheadedness, visual disturbance.  He is eating well and having good BM w/ more formed stool and urinating well. Ambulating when he gets a chance.      MEDICATIONS  (STANDING):  influenza   Vaccine 0.5 milliLiter(s) IntraMuscular once  piperacillin/tazobactam IVPB. 3.375 Gram(s) IV Intermittent every 8 hours    MEDICATIONS  (PRN):  acetaminophen   Tablet .. 650 milliGRAM(s) Oral every 6 hours PRN Temp greater or equal to 38C (100.4F), Mild Pain (1 - 3), Moderate Pain (4 - 6)      T(C): 36.6 (02-19-19 @ 05:24), Max: 39.2 (02-18-19 @ 21:44)  HR: 80 (02-19-19 @ 05:24) (80 - 108)  BP: 133/83 (02-19-19 @ 05:24) (133/83 - 137/75)  RR: 18 (02-19-19 @ 05:24) (18 - 18)  SpO2: 100% (02-19-19 @ 05:24) (98% - 100%)  CAPILLARY BLOOD GLUCOSE        I&O's Summary      PHYSICAL EXAM:  Constitutional: NAD. well-developed; well-groomed; well-nourished;  	HEENT: AT/NC, PERRLA; EOMI, MMM, no oropharyngeal lesions, no erythema, no exudates,   	Respiratory: CTAB. equal aeration bilaterally. no wheezing, no crackes, no rhonchi. no increase in WOB  	Cardiovascular: RRR, no M/R/G. 2+ distal pulses. Cap refill ~ 3 seconds. no JVD  	Gastrointestinal: soft; NT/ND, +BS, no rebounding tenderness or guarding.   	Genitourinary: not examined.  	Extremities: no clubbing; no cyanosis; no pedal edema, non-tender to palpation, DP and Radial pulses intact.  	Skin: warm and dry; color normal: no rash: no ulcers  	Neurological: A&Ox 3; responds to pain; responds to verbal commands; epicritic and protopathic sensation intact: CN nerves grossly intact.   	MSK/Back: no deformities. Active and passive ROM intact; strength intact, no CVA tenderness, No joint tenderness, swelling, erythema    Psychiatric: normal mood/affect. Denies SI/HI      LABS:                        12.0   5.99  )-----------( 257      ( 18 Feb 2019 05:18 )             37.0     02-18    136  |  101  |  9   ----------------------------<  101<H>  3.9   |  21<L>  |  0.94    Ca    8.8      18 Feb 2019 05:18  Phos  3.8     02-18  Mg     1.9     02-18    TPro  6.8  /  Alb  3.5  /  TBili  0.3  /  DBili  x   /  AST  86<H>  /  ALT  186<H>  /  AlkPhos  129<H>  02-18    Culture - Blood (02.15.19 @ 12:55)    Culture - Blood:   NO ORGANISMS ISOLATED  NO ORGANISMS ISOLATED AT 96 HOURS    Specimen Source: BLOOD    Culture - Urine    Specimen Source: URINE MIDSTREAM    Organism Identification: Escherichia coli CRE    Organism: Escherichia coli CRE  COLONY COUNT: 10,000-49,000 CFU/mL    Method Type: MICROSCAN NEG URINE COMBO 61    GI PCR Panel, Stool (02.16.19 @ 14:27)    :         WILIAN CALVERT MD  RESULT CALLED TO / READ BACK: JORGE WHITE  DATE / TIME CALLED: 02/18/19 1656  CALLED BY: LENORE CANALES^Salmonella sp.  GI PCR PANEL: DETECTED by PCR  CAMS^Campylobacter species  GI PCR PANEL: DETECTED by PCR    Specimen Source: FECES        RADIOLOGY & ADDITIONAL TESTS:    Imaging Personally Reviewed:   < from: CT Abdomen and Pelvis w/ IV Cont (02.15.19 @ 18:51) >  IMPRESSION:     Horseshoe kidney. Heterogeneous nephrogram raising concern for   pyelonephritis. Correlation with urinalysis is suggested..    < end of copied text >      Consultant(s) Notes Reviewed:  ID    Care Discussed with Consultants/Other Providers: ID (Dr. Akins) re: abx

## 2019-02-19 NOTE — PROGRESS NOTE ADULT - SUBJECTIVE AND OBJECTIVE BOX
CC: F/U for UTI, ? infectious diarrhea    Saw/spoke to patient. Still with fever, no chills. Diarrhea improved. Dysuria improved. No other new complaints.    Allergies  No Known Allergies    ANTIMICROBIALS:  piperacillin/tazobactam IVPB. 3.375 every 8 hours    PE:    Vital Signs Last 24 Hrs  T(C): 36.6 (19 Feb 2019 05:24), Max: 39.2 (18 Feb 2019 21:44)  T(F): 97.8 (19 Feb 2019 05:24), Max: 102.5 (18 Feb 2019 21:44)  HR: 80 (19 Feb 2019 05:24) (80 - 108)  BP: 133/83 (19 Feb 2019 05:24) (133/83 - 134/82)  RR: 18 (19 Feb 2019 05:24) (18 - 18)  SpO2: 100% (19 Feb 2019 05:24) (98% - 100%)    Gen: AOx3, NAD, non-toxic  CV: S1+S2 normal, nontachycardic  Resp: Clear bilat, no resp distress, no crackles/wheezes  Abd: Soft, nontender, +BS  Ext: No LE edema, no wounds  Skin: No rash    LABS:                        13.8   6.32  )-----------( 261      ( 19 Feb 2019 07:36 )             43.9     02-19    138  |  104  |  10  ----------------------------<  106<H>  4.1   |  22  |  0.84    Ca    9.3      19 Feb 2019 07:36  Phos  3.6     02-19  Mg     2.1     02-19    TPro  7.6  /  Alb  3.6  /  TBili  0.4  /  DBili  x   /  AST  83<H>  /  ALT  214<H>  /  AlkPhos  128<H>  02-19    MICROBIOLOGY:    FECES  02-16-19 GI PCR--Salmonella, Campy    BLOOD  02-15-19 -NGTD    URINE MIDSTREAM  02-15-19 --  --  Escherichia coli CRE--R Zosyn, gent    BLOOD PERIPHERAL  02-15-19 NGTD    RADIOLOGY:    2/15 CT:    FINDINGS:    CHEST:     LUNGS AND LARGE AIRWAYS: Patent central airways. No pulmonary nodules.  PLEURA: No pleural effusion.  VESSELS: Within normal limits.  HEART: Heart size is normal. No pericardial effusion.  MEDIASTINUM AND NAZARIO: No lymphadenopathy.  CHEST WALL AND LOWER NECK: Within normal limits.    ABDOMEN AND PELVIS:    LIVER: Within normal limits.  BILE DUCTS: Normal caliber.  GALLBLADDER: Within normal limits.  SPLEEN: Within normal limits.  PANCREAS: Within normal limits.  ADRENALS: Within normal limits.  KIDNEYS/URETERS: Horseshoe kidney. Heterogeneous nephrogram with   scattered hypodense foci within the cortices raising concern for   pyelonephritis. No renal stones or hydronephrosis.    BLADDER: Within normal limits.  REPRODUCTIVE ORGANS: Prostate within normal limits.    BOWEL: No bowel obstruction. Appendix  is normal.  PERITONEUM: No ascites.   VESSELS:  Within normal limits.  RETROPERITONEUM: No lymphadenopathy.    ABDOMINAL WALL: Within normal limits.  BONES: Within normal limits.    IMPRESSION:     Horseshoe kidney. Heterogeneous nephrogram raising concern for   pyelonephritis. Correlation with urinalysis is suggested..    2/16 USG:    IMPRESSION:     Horseshoe kidney is not well appreciated on this examination compared to   CT abdomen and pelvis 2/15/2019. No hydronephrosis.    Liver within normal limits. CC: F/U for UTI, ? infectious diarrhea    Saw/spoke to patient. Still with fever, no chills. Diarrhea improved. Dysuria improved. No other new complaints.    Allergies  No Known Allergies    ANTIMICROBIALS:  piperacillin/tazobactam IVPB. 3.375 every 8 hours    PE:    Vital Signs Last 24 Hrs  T(C): 36.6 (19 Feb 2019 05:24), Max: 39.2 (18 Feb 2019 21:44)  T(F): 97.8 (19 Feb 2019 05:24), Max: 102.5 (18 Feb 2019 21:44)  HR: 80 (19 Feb 2019 05:24) (80 - 108)  BP: 133/83 (19 Feb 2019 05:24) (133/83 - 134/82)  RR: 18 (19 Feb 2019 05:24) (18 - 18)  SpO2: 100% (19 Feb 2019 05:24) (98% - 100%)    Gen: AOx3, NAD, non-toxic  CV: S1+S2 normal, nontachycardic  Resp: Clear bilat, no resp distress, no crackles/wheezes  Abd: Soft, nontender, +BS  Ext: No LE edema, no wounds  Skin: No rash    LABS:                        13.8   6.32  )-----------( 261      ( 19 Feb 2019 07:36 )             43.9     02-19    138  |  104  |  10  ----------------------------<  106<H>  4.1   |  22  |  0.84    Ca    9.3      19 Feb 2019 07:36  Phos  3.6     02-19  Mg     2.1     02-19    TPro  7.6  /  Alb  3.6  /  TBili  0.4  /  DBili  x   /  AST  83<H>  /  ALT  214<H>  /  AlkPhos  128<H>  02-19    MICROBIOLOGY:    FECES  02-16-19 GI PCR--Salmonella, Campy    BLOOD  02-15-19 -NGTD    URINE MIDSTREAM  02-15-19 --  --  Escherichia coli CRE--R Zosyn    BLOOD PERIPHERAL  02-15-19 NGTD    RADIOLOGY:    2/15 CT:    FINDINGS:    CHEST:     LUNGS AND LARGE AIRWAYS: Patent central airways. No pulmonary nodules.  PLEURA: No pleural effusion.  VESSELS: Within normal limits.  HEART: Heart size is normal. No pericardial effusion.  MEDIASTINUM AND NAZARIO: No lymphadenopathy.  CHEST WALL AND LOWER NECK: Within normal limits.    ABDOMEN AND PELVIS:    LIVER: Within normal limits.  BILE DUCTS: Normal caliber.  GALLBLADDER: Within normal limits.  SPLEEN: Within normal limits.  PANCREAS: Within normal limits.  ADRENALS: Within normal limits.  KIDNEYS/URETERS: Horseshoe kidney. Heterogeneous nephrogram with   scattered hypodense foci within the cortices raising concern for   pyelonephritis. No renal stones or hydronephrosis.    BLADDER: Within normal limits.  REPRODUCTIVE ORGANS: Prostate within normal limits.    BOWEL: No bowel obstruction. Appendix  is normal.  PERITONEUM: No ascites.   VESSELS:  Within normal limits.  RETROPERITONEUM: No lymphadenopathy.    ABDOMINAL WALL: Within normal limits.  BONES: Within normal limits.    IMPRESSION:     Horseshoe kidney. Heterogeneous nephrogram raising concern for   pyelonephritis. Correlation with urinalysis is suggested..    2/16 USG:    IMPRESSION:     Horseshoe kidney is not well appreciated on this examination compared to   CT abdomen and pelvis 2/15/2019. No hydronephrosis.    Liver within normal limits.

## 2019-02-19 NOTE — PROGRESS NOTE ADULT - PROBLEM SELECTOR PLAN 1
2 week history of fever from unclear etiology during visit to [place]. History complicated by Diarrhea and intermittent coughing.   - FUO could be from infection given constellation of symptoms. DDx broad including Hepatitis (Viral and/or EtOH), HIV, typhoid, shigella, campylobacter, yersinia, aeromonas, listeria, GI parasites, TB, malaria, etc.  - UCx positive for E. Coli in midrange in setting of long Augmentin. Patient currently denies any UTI symptoms. Asymptomatic bacteruria.  - Malaria negative x2,   - Dengue pending  - BCx x3, RVP, Quant TB test, GI PCR/ova and parasite pending.   - EBV, CMV, Chikungunya pending  - CXR clear. CT chest clear w/ no signs of TB. removed airborne precaution.  - RUQ U/S unremarkable   - CT Chest and A/P w/o obvious infectious foci. Heterogeneity in kidney with ?pyelo. Yet patient w/ CVA tenderness or Urinary symptoms.  - c/w Zosyn for empiric coverage, s/p 1x gentamycin (2/17)   - ID on board 2 week history of fever from unclear etiology during visit to [facility]. History complicated by Diarrhea and intermittent coughing.   - FUO could be from infection given constellation of symptoms. DDx broad including Hepatitis (Viral and/or EtOH), HIV, typhoid, shigella, campylobacter, yersinia, aeromonas, listeria, GI parasites, TB, malaria, etc.  - UCx positive for E. Coli in midrange in setting of long Augmentin. Patient currently denies any UTI symptoms. Asymptomatic bacteruria.  - GI PCR positive for salmonella and campylobacter.  - Malaria negative x2, BCx x3 NGTD, RVP negative,   - Dengue, Chikungunya, CMV, Ova/parasite, Quant pending  - CXR clear. CT chest clear w/ no signs of TB. removed airborne precaution.  - RUQ U/S unremarkable   - CT Chest and A/P w/o obvious infectious foci. Heterogeneity in kidney with ?pyelo. Yet patient w/o CVA tenderness or Urinary symptoms.  - c/w Zosyn for empiric coverage, s/p 1x gentamycin (2/17)   - ID on board: will contact ID to discuss about lab results and ABx choice.

## 2019-02-19 NOTE — PROGRESS NOTE ADULT - ASSESSMENT
27 yo M no PMH recent travel to [place] presenting with febrile illness.  Fever, tachycardia  Diarrhea, chills, mild dry cough--no other symptoms noted  Malaria smear neg x 1  Treated with PO augmentin as outpatient in [facility]  Now suspected infectious diarrhea vs pyelonephritis as cause of symptoms  UCX with CRE E coli R Zosyn, Gent  GI PCR with Salmonella, Campylobacter  Overall, persistent fever, foreign travel, tachycardia, ? intra-abd infection, new CRE culture finding  - Poly B 600,000IU q 12 (needs close monitoring of Cr, symptoms of neuropathy, and electrolytes--possible adverse effect of toxic drug)  - Azithromycin 500mg q 24 x 3 doses  - DC Zosyn  - Requested micro to check CRE for wayne noonan poly b Michael Oey, MD  Pager [phone]  After 5pm and on weekends call [phone] 29 yo M no PMH recent travel to [place] presenting with febrile illness.  Fever, tachycardia  Diarrhea, chills, mild dry cough--no other symptoms noted  Malaria smear neg x 1  Treated with PO augmentin as outpatient in [facility]  Now suspected infectious diarrhea vs pyelonephritis as cause of symptoms  UCX with CRE E coli R Zosyn  GI PCR with Salmonella, Campylobacter  Overall, persistent fever, foreign travel, tachycardia, ? intra-abd infection, new CRE culture finding  - Poly B 600,000IU q 12 (needs close monitoring of Cr, symptoms of neuropathy, and electrolytes--possible adverse effect of toxic drug)  - Azithromycin 500mg q 24 x 3 doses  - DC Zosyn  - Requested micro to check CRE for avycaz, vabomere, poly b  - Discussed with medicine intern    Mj Akins MD  Pager [phone]  After 5pm and on weekends call [phone]

## 2019-02-19 NOTE — PROVIDER CONTACT NOTE (OTHER) - RECOMMENDATIONS
give benadryl prophylactically; it is a side effect to medication, continue to monitor patient and reassess

## 2019-02-19 NOTE — PROVIDER CONTACT NOTE (OTHER) - ACTION/TREATMENT ORDERED:
No need for benadryl at this time, periorbital numbness and tingling is a SE of medication . Hold AM dose until day team comes in to see if they would like to continue with medication

## 2019-02-19 NOTE — PROGRESS NOTE ADULT - ATTENDING COMMENTS
Patient seen and examined at bedside with HS3. Patient wo complaints, diarrhea improving, denies any back pain, dysuria, abdominal pain. Still febrile, however, to Patient seen and examined at bedside with HS3. Patient wo complaints, diarrhea improving, denies any back pain, dysuria, abdominal pain. Still febrile, however, to 102.5 overnight. GI PCR reveals Salmonella species and Campylobacter species. To receive 3 days of po Azithromycin for this. 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 Will need to monitor renal function and for neuropathy closely. 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 Remaining care as noted above.

## 2019-02-20 LAB
ALBUMIN SERPL ELPH-MCNC: 3.5 G/DL — SIGNIFICANT CHANGE UP (ref 3.3–5)
ALP SERPL-CCNC: 122 U/L — HIGH (ref 40–120)
ALT FLD-CCNC: 227 U/L — HIGH (ref 4–41)
ANION GAP SERPL CALC-SCNC: 13 MMO/L — SIGNIFICANT CHANGE UP (ref 7–14)
AST SERPL-CCNC: 90 U/L — HIGH (ref 4–40)
BACTERIA BLD CULT: SIGNIFICANT CHANGE UP
BILIRUB SERPL-MCNC: 0.5 MG/DL — SIGNIFICANT CHANGE UP (ref 0.2–1.2)
BUN SERPL-MCNC: 10 MG/DL — SIGNIFICANT CHANGE UP (ref 7–23)
CALCIUM SERPL-MCNC: 9.2 MG/DL — SIGNIFICANT CHANGE UP (ref 8.4–10.5)
CHLORIDE SERPL-SCNC: 101 MMOL/L — SIGNIFICANT CHANGE UP (ref 98–107)
CK SERPL-CCNC: 48 U/L — SIGNIFICANT CHANGE UP (ref 30–200)
CO2 SERPL-SCNC: 23 MMOL/L — SIGNIFICANT CHANGE UP (ref 22–31)
CREAT SERPL-MCNC: 0.9 MG/DL — SIGNIFICANT CHANGE UP (ref 0.5–1.3)
GLUCOSE SERPL-MCNC: 97 MG/DL — SIGNIFICANT CHANGE UP (ref 70–99)
HCT VFR BLD CALC: 39.4 % — SIGNIFICANT CHANGE UP (ref 39–50)
HGB BLD-MCNC: 12.7 G/DL — LOW (ref 13–17)
MAGNESIUM SERPL-MCNC: 1.9 MG/DL — SIGNIFICANT CHANGE UP (ref 1.6–2.6)
MCHC RBC-ENTMCNC: 26.4 PG — LOW (ref 27–34)
MCHC RBC-ENTMCNC: 32.2 % — SIGNIFICANT CHANGE UP (ref 32–36)
MCV RBC AUTO: 81.9 FL — SIGNIFICANT CHANGE UP (ref 80–100)
NRBC # FLD: 0 K/UL — LOW (ref 25–125)
PHOSPHATE SERPL-MCNC: 4 MG/DL — SIGNIFICANT CHANGE UP (ref 2.5–4.5)
PLATELET # BLD AUTO: 264 K/UL — SIGNIFICANT CHANGE UP (ref 150–400)
PMV BLD: 11.7 FL — SIGNIFICANT CHANGE UP (ref 7–13)
POTASSIUM SERPL-MCNC: 4.4 MMOL/L — SIGNIFICANT CHANGE UP (ref 3.5–5.3)
POTASSIUM SERPL-SCNC: 4.4 MMOL/L — SIGNIFICANT CHANGE UP (ref 3.5–5.3)
PROT SERPL-MCNC: 7 G/DL — SIGNIFICANT CHANGE UP (ref 6–8.3)
RBC # BLD: 4.81 M/UL — SIGNIFICANT CHANGE UP (ref 4.2–5.8)
RBC # FLD: 14.1 % — SIGNIFICANT CHANGE UP (ref 10.3–14.5)
SODIUM SERPL-SCNC: 137 MMOL/L — SIGNIFICANT CHANGE UP (ref 135–145)
WBC # BLD: 6.97 K/UL — SIGNIFICANT CHANGE UP (ref 3.8–10.5)
WBC # FLD AUTO: 6.97 K/UL — SIGNIFICANT CHANGE UP (ref 3.8–10.5)

## 2019-02-20 PROCEDURE — 99233 SBSQ HOSP IP/OBS HIGH 50: CPT | Mod: GC

## 2019-02-20 PROCEDURE — 99233 SBSQ HOSP IP/OBS HIGH 50: CPT

## 2019-02-20 RX ORDER — POLYMYXIN B SULFATE 500000 [USP'U]/1
500000 INJECTION, POWDER, LYOPHILIZED, FOR SOLUTION INTRAMUSCULAR; INTRATHECAL; INTRAVENOUS; OPHTHALMIC EVERY 12 HOURS
Qty: 0 | Refills: 0 | Status: DISCONTINUED | OUTPATIENT
Start: 2019-02-20 | End: 2019-02-20

## 2019-02-20 RX ADMIN — AZITHROMYCIN 500 MILLIGRAM(S): 500 TABLET, FILM COATED ORAL at 12:43

## 2019-02-20 RX ADMIN — POLYMYXIN B SULFATE 250 UNIT(S): 500000 INJECTION, POWDER, LYOPHILIZED, FOR SOLUTION INTRAMUSCULAR; INTRATHECAL; INTRAVENOUS; OPHTHALMIC at 10:12

## 2019-02-20 RX ADMIN — Medication 650 MILLIGRAM(S): at 15:38

## 2019-02-20 RX ADMIN — Medication 650 MILLIGRAM(S): at 19:25

## 2019-02-20 NOTE — PROGRESS NOTE ADULT - SUBJECTIVE AND OBJECTIVE BOX
Luc Hurtado Rosio  PGY-1  Pager: [phone]    Patient is a 28y old  Male who presents with a chief complaint of persistent Fever (19 Feb 2019 08:02)      SUBJECTIVE / OVERNIGHT EVENTS:    MEDICATIONS  (STANDING):  azithromycin   Tablet 500 milliGRAM(s) Oral every 24 hours  influenza   Vaccine 0.5 milliLiter(s) IntraMuscular once  polymyxin B IVPB 257095 Unit(s) IV Intermittent every 12 hours    MEDICATIONS  (PRN):  acetaminophen   Tablet .. 650 milliGRAM(s) Oral every 6 hours PRN Temp greater or equal to 38C (100.4F), Mild Pain (1 - 3), Moderate Pain (4 - 6)      T(C): 37.2 (02-20-19 @ 05:51), Max: 37.3 (02-19-19 @ 14:35)  HR: 90 (02-20-19 @ 05:51) (86 - 97)  BP: 120/59 (02-20-19 @ 05:51) (120/59 - 145/73)  RR: 17 (02-20-19 @ 05:51) (17 - 18)  SpO2: 100% (02-20-19 @ 05:51) (100% - 100%)  CAPILLARY BLOOD GLUCOSE        I&O's Summary      PHYSICAL EXAM:  GENERAL: NAD, well-developed  HEAD:  Atraumatic, Normocephalic  EYES: EOMI, PERRLA, conjunctiva and sclera clear  NECK: Supple, No JVD  CHEST/LUNG: Clear to auscultation bilaterally; No wheeze  HEART: Regular rate and rhythm; No murmurs, rubs, or gallops  ABDOMEN: Soft, Nontender, Nondistended; Bowel sounds present  EXTREMITIES:  2+ Peripheral Pulses, No clubbing, cyanosis, or edema  PSYCH: AAOx3  NEUROLOGY: non-focal  SKIN: No rashes or lesions    LABS:                        13.8   6.32  )-----------( 261      ( 19 Feb 2019 07:36 )             43.9     02-19    138  |  104  |  10  ----------------------------<  106<H>  4.1   |  22  |  0.84    Ca    9.3      19 Feb 2019 07:36  Phos  3.6     02-19  Mg     2.1     02-19    TPro  7.6  /  Alb  3.6  /  TBili  0.4  /  DBili  x   /  AST  83<H>  /  ALT  214<H>  /  AlkPhos  128<H>  02-19              RADIOLOGY & ADDITIONAL TESTS:    Imaging Personally Reviewed:    Consultant(s) Notes Reviewed:      Care Discussed with Consultants/Other Providers: Luc Hurtado Rosio  PGY-1  Pager: [phone]    Patient is a 28y old  Male who presents with a chief complaint of persistent Fever (19 Feb 2019 08:02)      SUBJECTIVE / OVERNIGHT EVENTS:  No acute events overnight.  Had subjective fever overnight, though not recorded in flowsheet.      MEDICATIONS  (STANDING):  azithromycin   Tablet 500 milliGRAM(s) Oral every 24 hours  influenza   Vaccine 0.5 milliLiter(s) IntraMuscular once  polymyxin B IVPB 431413 Unit(s) IV Intermittent every 12 hours    MEDICATIONS  (PRN):  acetaminophen   Tablet .. 650 milliGRAM(s) Oral every 6 hours PRN Temp greater or equal to 38C (100.4F), Mild Pain (1 - 3), Moderate Pain (4 - 6)      T(C): 37.2 (02-20-19 @ 05:51), Max: 37.3 (02-19-19 @ 14:35)  HR: 90 (02-20-19 @ 05:51) (86 - 97)  BP: 120/59 (02-20-19 @ 05:51) (120/59 - 145/73)  RR: 17 (02-20-19 @ 05:51) (17 - 18)  SpO2: 100% (02-20-19 @ 05:51) (100% - 100%)  CAPILLARY BLOOD GLUCOSE        I&O's Summary      PHYSICAL EXAM:  GENERAL: NAD, well-developed  HEAD:  Atraumatic, Normocephalic  EYES: EOMI, PERRLA, conjunctiva and sclera clear  NECK: Supple, No JVD  CHEST/LUNG: Clear to auscultation bilaterally; No wheeze  HEART: Regular rate and rhythm; No murmurs, rubs, or gallops  ABDOMEN: Soft, Nontender, Nondistended; Bowel sounds present  EXTREMITIES:  2+ Peripheral Pulses, No clubbing, cyanosis, or edema  PSYCH: AAOx3  NEUROLOGY: non-focal  SKIN: No rashes or lesions    LABS:                        13.8   6.32  )-----------( 261      ( 19 Feb 2019 07:36 )             43.9     02-19    138  |  104  |  10  ----------------------------<  106<H>  4.1   |  22  |  0.84    Ca    9.3      19 Feb 2019 07:36  Phos  3.6     02-19  Mg     2.1     02-19    TPro  7.6  /  Alb  3.6  /  TBili  0.4  /  DBili  x   /  AST  83<H>  /  ALT  214<H>  /  AlkPhos  128<H>  02-19              RADIOLOGY & ADDITIONAL TESTS:    Imaging Personally Reviewed:    Consultant(s) Notes Reviewed:      Care Discussed with Consultants/Other Providers: Luc Avelar  PGY-1  Pager: [phone]    Patient is a 28y old  Male who presents with a chief complaint of persistent Fever (19 Feb 2019 08:02)      SUBJECTIVE / OVERNIGHT EVENTS:  No acute events overnight.  Had subjective fever overnight, though not recorded in flowsheet.  Patient subjectively doing well otherwise. Eating fine w/o n/v. Diarrhea resolved.  He repots having perioral numbness as well as bilateral hand numbness after polymyxin infusion yesterday. First dose was stopped and second trial was not given per nursing staff.  He denies chilld, rigors, SOB, CP, palpitation, headache, lightheadedness, visual disturbance.      MEDICATIONS  (STANDING):  azithromycin   Tablet 500 milliGRAM(s) Oral every 24 hours  influenza   Vaccine 0.5 milliLiter(s) IntraMuscular once  polymyxin B IVPB 874995 Unit(s) IV Intermittent every 12 hours    MEDICATIONS  (PRN):  acetaminophen   Tablet .. 650 milliGRAM(s) Oral every 6 hours PRN Temp greater or equal to 38C (100.4F), Mild Pain (1 - 3), Moderate Pain (4 - 6)      T(C): 37.2 (02-20-19 @ 05:51), Max: 37.3 (02-19-19 @ 14:35)  HR: 90 (02-20-19 @ 05:51) (86 - 97)  BP: 120/59 (02-20-19 @ 05:51) (120/59 - 145/73)  RR: 17 (02-20-19 @ 05:51) (17 - 18)  SpO2: 100% (02-20-19 @ 05:51) (100% - 100%)  CAPILLARY BLOOD GLUCOSE        I&O's Summary      PHYSICAL EXAM:  Constitutional: NAD. well-developed; well-groomed; well-nourished;  	HEENT: AT/NC, PERRLA; EOMI, MMM,   	Respiratory: CTAB. equal aeration bilaterally. no wheezing, no crackes, no rhonchi. no increase in WOB  	Cardiovascular: RRR, no M/R/G. 2+ distal pulses. Cap refill ~ 3 seconds  	Gastrointestinal: soft; NT/ND, +BS, no rebounding tenderness or guarding.   	Genitourinary: not examined.  	Extremities: no cyanosis; no pedal edema, non-tender to palpation, DP and Radial pulses intact.  	Neurological: A&Ox 3; responds to pain; responds to verbal commands; epicritic and protopathic sensation intact: CN nerves grossly intact.   	MSK/Back: no deformities. Active and passive ROM intact; strength intact, ambulating    Psychiatric: normal mood/affect.       LABS:                        13.8   6.32  )-----------( 261      ( 19 Feb 2019 07:36 )             43.9     02-19    138  |  104  |  10  ----------------------------<  106<H>  4.1   |  22  |  0.84    Ca    9.3      19 Feb 2019 07:36  Phos  3.6     02-19  Mg     2.1     02-19    TPro  7.6  /  Alb  3.6  /  TBili  0.4  /  DBili  x   /  AST  83<H>  /  ALT  214<H>  /  AlkPhos  128<H>  02-19              RADIOLOGY & ADDITIONAL TESTS:    Imaging Personally Reviewed:    Consultant(s) Notes Reviewed:      Care Discussed with Consultants/Other Providers: Luc Avelar  PGY-1  Pager: [phone]    Patient is a 28y old  Male who presents with a chief complaint of persistent Fever (19 Feb 2019 08:02)      SUBJECTIVE / OVERNIGHT EVENTS:  No acute events overnight.  Had subjective fever overnight, though not recorded in flowsheet.  Patient subjectively doing well otherwise. Eating fine w/o n/v. Diarrhea resolved.  He repots having perioral numbness as well as bilateral hand numbness after polymyxin infusion yesterday, which was followed by disorientation feeling ("feeling drunk"). First dose was stopped and second trial was not given per nursing staff.  He denies chilld, rigors, SOB, CP, palpitation, headache, lightheadedness, visual disturbance.      MEDICATIONS  (STANDING):  azithromycin   Tablet 500 milliGRAM(s) Oral every 24 hours  influenza   Vaccine 0.5 milliLiter(s) IntraMuscular once  polymyxin B IVPB 568256 Unit(s) IV Intermittent every 12 hours    MEDICATIONS  (PRN):  acetaminophen   Tablet .. 650 milliGRAM(s) Oral every 6 hours PRN Temp greater or equal to 38C (100.4F), Mild Pain (1 - 3), Moderate Pain (4 - 6)      T(C): 37.2 (02-20-19 @ 05:51), Max: 37.3 (02-19-19 @ 14:35)  HR: 90 (02-20-19 @ 05:51) (86 - 97)  BP: 120/59 (02-20-19 @ 05:51) (120/59 - 145/73)  RR: 17 (02-20-19 @ 05:51) (17 - 18)  SpO2: 100% (02-20-19 @ 05:51) (100% - 100%)  CAPILLARY BLOOD GLUCOSE        I&O's Summary      PHYSICAL EXAM:  Constitutional: NAD. well-developed; well-groomed; well-nourished;  	HEENT: AT/NC, PERRLA; EOMI, MMM,   	Respiratory: CTAB. equal aeration bilaterally. no wheezing, no crackes, no rhonchi. no increase in WOB  	Cardiovascular: RRR, no M/R/G. 2+ distal pulses. Cap refill ~ 3 seconds  	Gastrointestinal: soft; NT/ND, +BS, no rebounding tenderness or guarding.   	Genitourinary: not examined.  	Extremities: no cyanosis; no pedal edema, non-tender to palpation, DP and Radial pulses intact.  	Neurological: A&Ox 3; responds to pain; responds to verbal commands; epicritic and protopathic sensation intact: CN nerves grossly intact.   	MSK/Back: no deformities. Active and passive ROM intact; strength intact, ambulating    Psychiatric: normal mood/affect.       LABS:                        13.8   6.32  )-----------( 261      ( 19 Feb 2019 07:36 )             43.9     02-19    138  |  104  |  10  ----------------------------<  106<H>  4.1   |  22  |  0.84    Ca    9.3      19 Feb 2019 07:36  Phos  3.6     02-19  Mg     2.1     02-19    TPro  7.6  /  Alb  3.6  /  TBili  0.4  /  DBili  x   /  AST  83<H>  /  ALT  214<H>  /  AlkPhos  128<H>  02-19              RADIOLOGY & ADDITIONAL TESTS:    Imaging Personally Reviewed:    Consultant(s) Notes Reviewed:      Care Discussed with Consultants/Other Providers: Luc Avelar  PGY-1  Pager: [phone]    Patient is a 28y old  Male who presents with a chief complaint of persistent Fever (19 Feb 2019 08:02)      SUBJECTIVE / OVERNIGHT EVENTS:  No acute events overnight.  Had subjective fever overnight, though not recorded in flowsheet.  Patient subjectively doing well otherwise. Eating fine w/o n/v. Diarrhea resolved.  He repots having perioral numbness as well as bilateral hand numbness after polymyxin infusion yesterday, which was followed by disorientation feeling ("feeling drunk"). First dose was stopped and second trial was not given per nursing staff.  He denies chilld, rigors, SOB, CP, palpitation, headache, lightheadedness, visual disturbance.      MEDICATIONS  (STANDING):  azithromycin   Tablet 500 milliGRAM(s) Oral every 24 hours  influenza   Vaccine 0.5 milliLiter(s) IntraMuscular once  polymyxin B IVPB [id number] Unit(s) IV Intermittent every 12 hours    MEDICATIONS  (PRN):  acetaminophen   Tablet .. 650 milliGRAM(s) Oral every 6 hours PRN Temp greater or equal to 38C (100.4F), Mild Pain (1 - 3), Moderate Pain (4 - 6)      T(C): 37.2 (02-20-19 @ 05:51), Max: 37.3 (02-19-19 @ 14:35)  HR: 90 (02-20-19 @ 05:51) (86 - 97)  BP: 120/59 (02-20-19 @ 05:51) (120/59 - 145/73)  RR: 17 (02-20-19 @ 05:51) (17 - 18)  SpO2: 100% (02-20-19 @ 05:51) (100% - 100%)  CAPILLARY BLOOD GLUCOSE        I&O's Summary      PHYSICAL EXAM:  Constitutional: NAD. well-developed; well-groomed; well-nourished;  	HEENT: AT/NC, PERRLA; EOMI, MMM,   	Respiratory: CTAB. equal aeration bilaterally. no wheezing, no crackes, no rhonchi. no increase in WOB  	Cardiovascular: RRR, no M/R/G. 2+ distal pulses. Cap refill ~ 3 seconds  	Gastrointestinal: soft; NT/ND, +BS, no rebounding tenderness or guarding.   	Genitourinary: not examined.  	Extremities: no cyanosis; no pedal edema, non-tender to palpation, DP and Radial pulses intact.  	Neurological: A&Ox 3; responds to pain; responds to verbal commands; epicritic and protopathic sensation intact: CN nerves grossly intact.   	MSK/Back: no deformities. Active and passive ROM intact; strength intact, ambulating    Psychiatric: normal mood/affect.       LABS:                          12.7   6.97  )-----------( 264      ( 20 Feb 2019 06:25 )             39.4     02-20    137  |  101  |  10  ----------------------------<  97  4.4   |  23  |  0.90    Ca    9.2      20 Feb 2019 06:25  Phos  4.0     02-20  Mg     1.9     02-20    TPro  7.0  /  Alb  3.5  /  TBili  0.5  /  DBili  x   /  AST  90<H>  /  ALT  227<H>  /  AlkPhos  122<H>  02-20      CMVPCR Log: Not Detected      Culture - Blood (02.15.19 @ 12:55)    Culture - Blood:   NO ORGANISMS ISOLATED    Specimen Source: BLOOD        RADIOLOGY & ADDITIONAL TESTS:    Imaging Personally Reviewed:    Consultant(s) Notes Reviewed:  ID    Care Discussed with Consultants/Other Providers: ID (Dr. Akins) re: plan of care

## 2019-02-20 NOTE — PROGRESS NOTE ADULT - PROBLEM SELECTOR PLAN 1
2 week history of fever from unclear etiology during visit to [facility]. History complicated by Diarrhea and intermittent coughing.   - FUO could be from infection given constellation of symptoms. DDx broad including Hepatitis (Viral and/or EtOH), HIV, typhoid, shigella, campylobacter, yersinia, aeromonas, listeria, GI parasites, TB, malaria, etc.  - UCx positive for E. Coli in midrange in setting of long Augmentin. Patient currently denies any UTI symptoms. Asymptomatic bacteruria.  - GI PCR positive for salmonella and campylobacter.  - Malaria negative x2, BCx x3 NGTD, RVP negative,   - Dengue, Chikungunya, CMV, Ova/parasite, Quant pending  - CXR clear. CT chest clear w/ no signs of TB. removed airborne precaution.  - RUQ U/S unremarkable   - CT Chest and A/P w/o obvious infectious foci. Heterogeneity in kidney with ?pyelo. Yet patient w/o CVA tenderness or Urinary symptoms.  - c/w Zosyn for empiric coverage, s/p 1x gentamycin (2/17)   - ID on board: will contact ID to discuss about lab results and ABx choice. 2 week history of fever from unclear etiology during visit to [place]. History complicated by Diarrhea and intermittent coughing. Likely 2/2 Pyelo/UTI vs. campylobacter/salmonella infection.  - FUO could be from infection given constellation of symptoms. DDx broad including Hepatitis (Viral and/or EtOH), HIV, typhoid, shigella, campylobacter, yersinia, aeromonas, listeria, GI parasites, TB, malaria, etc.  - UCx positive for E. Coli in midrange in setting of long Augmentin. Imaging concerning for pyelo. Patient currently denies any UTI Sx or pyelo Sx.  - GI PCR positive for salmonella and campylobacter.  - Malaria negative x2, BCx x3 NGTD, RVP negative,   - Dengue, Chikungunya, CMV, Ova/parasite, Quant pending  - CXR clear. CT chest clear w/ no signs of TB. removed airborne precaution.  - RUQ U/S unremarkable   - CT Chest and A/P w/o obvious infectious foci. Heterogeneity in kidney with ?pyelo. Yet patient w/o CVA tenderness or Urinary symptoms.  - ID on board  - Now on 3 day course of Azithro (Day 2/3 today) for campylobacter/salmonella  - on Polymyxin B given his travel history and sensitivity result w/ multiple drug resistance. Extra drug sensitivity test sent by ID. Had neurologic side effect with polymyxin B. discussed w/ Dr. Akins (ID attending), and will try reduced dose of Polymyxin B. 2 week history of fever from unclear etiology during visit to [place]. History complicated by Diarrhea and intermittent coughing. Likely 2/2 Pyelo/UTI vs. campylobacter/salmonella infection.  - FUO could be from infection given constellation of symptoms. DDx broad including Hepatitis (Viral and/or EtOH), HIV, typhoid, shigella, campylobacter, yersinia, aeromonas, listeria, GI parasites, TB, malaria, etc.  - UCx positive for E. Coli in midrange in setting of long Augmentin. Imaging concerning for pyelo. Patient currently denies any UTI Sx or pyelo Sx.  - GI PCR positive for salmonella and campylobacter.  - Malaria negative x2, BCx x3 NGTD, RVP negative,   - Dengue, Chikungunya, CMV, Ova/parasite, Quant pending  - CXR clear. CT chest clear w/ no signs of TB. removed airborne precaution.  - RUQ U/S unremarkable   - CT Chest and A/P w/o obvious infectious foci. Heterogeneity in kidney with ?pyelo. Yet patient w/o CVA tenderness or Urinary symptoms.  - ID on board  - Now on 3 day course of Azithro (Day 2/3 today) for campylobacter/salmonella  - on Polymyxin B given his travel history and sensitivity result w/ multiple drug resistance. Extra drug sensitivity test sent by ID. Had neurologic side effect with polymyxin B. discussed w/ Dr. Akins (ID attending), and will try reduced dose of Polymyxin B  -Will send basic rheumatologic work-up and LDH/haptoglobin to assess for alternate cause of fever

## 2019-02-20 NOTE — PROGRESS NOTE ADULT - ASSESSMENT
Mr. Romero is a 29 y/o male with no PMH who presented to the ED w/ Fever of unknown origin. Given his travel history and lab abnormalities, infection is highly likely. Mr. Romero is a 29 y/o male with no PMH who presented to the ED w/ Fever of unknown origin likely 2/2 Pyelo/UTI from MDR E. coli vs. campylobacter/salmonella infection.

## 2019-02-20 NOTE — PROGRESS NOTE ADULT - PROBLEM SELECTOR PLAN 2
Hx of Acute diarrhea in setting of fever and starting Augmentin. Now mostly resolved. More formed stool yet still loose w/ 2x BM per day. Seems to have diarrhea after PO intake (meals) likely in the setting of Salmonella and Campylobacter infection  -Azithromycin 500mg po Qdaily x 3 days as per ID Hx of Acute diarrhea in setting of fever and starting Augmentin. Now resolved.  Seems to have diarrhea after PO intake (meals) likely in the setting of Salmonella and Campylobacter infection.  -Azithromycin 500mg po Qdaily x 3 days as per ID

## 2019-02-20 NOTE — PROGRESS NOTE ADULT - ASSESSMENT
27 yo M no PMH recent travel to [place] presenting with febrile illness.  Fever, tachycardia  Diarrhea, chills, mild dry cough--no other symptoms noted  Malaria smear neg  Treated with PO augmentin as outpatient in [facility]  Now suspected infectious diarrhea vs pyelonephritis as cause of symptoms  UCX with CRE E coli R Zosyn (low CFU)  GI PCR with Salmonella, Campylobacter  Lower suspicion for UTI presently, now afebrile--for now, treat for infectious diarrhea 2/2 campy/salmonella  Monitor for any further fevers  Overall, persistent fever, foreign travel, tachycardia, intra-abd infection/campylobacter, new CRE culture finding  - Azithromycin 500mg q 24 x 3 doses  - DC Poly, monitor off for now  - Requested micro to check CRE for avycaz, vabomere, poly b  - Discussed case with medicine attending    Mj Akins MD  Pager [phone]  After 5pm and on weekends call [phone] 27 yo M no PMH recent travel to [place] presenting with febrile illness.  Fever, tachycardia  Diarrhea, chills, mild dry cough--no other symptoms noted  Malaria smear neg  Treated with PO augmentin as outpatient in [facility]  Now suspected infectious diarrhea vs pyelonephritis as cause of symptoms  UCX with CRE E coli R Zosyn (low CFU)  GI PCR with Salmonella, Campylobacter  Lower suspicion for UTI presently, now afebrile--for now, treat for infectious diarrhea 2/2 campy/salmonella  Monitor for any further fevers  Toxic A/E from Poly B--perioral numbness  Overall, persistent fever, foreign travel, tachycardia, intra-abd infection/campylobacter, new CRE culture finding  - Azithromycin 500mg q 24 x 3 doses  - DC Poly, monitor off for now  - Requested micro to check CRE for avycaz, vabomere, poly b  - Discussed case with medicine attending    Mj Akins MD  Pager [phone]  After 5pm and on weekends call [phone]

## 2019-02-20 NOTE — PROGRESS NOTE ADULT - ATTENDING COMMENTS
Patient seen and examined at bedside. Uncle present. No documented fevers overnight but patient reports he was having chills, sweats and subjectively felt feverish like he has in the past when he was febrile. He did not let nursing staff know during the episode. Overnight, patient also developed periorbital numbness and tingling as well as in the hands. Team spoke with ID who initially recommended decreased polymyxin dose and to run infusion over a longer period of time. During my evaluation, patient was receiving the lower dose, slower infusion, but did endorse periorbital numbness, however, no tingling of the hands. Spoke with ID attg, Dr. Akins, given patient continues to deny  symptoms at this time and given concerns for possible polymyxin side effect, will hold off on abx at this time and continue to observe clinically. Await further antibiotic sensitivity testing for the CRE E. coli in the urine.     Of note, LFT's slowly uptrending, could be a Patient seen and examined at bedside. Uncle present. No documented fevers overnight but patient reports he was having chills, sweats and subjectively felt feverish like he has in the past when he was febrile. He did not let nursing staff know during the episode. Overnight, patient also developed perioral numbness and tingling as well as in the hands. Team spoke with ID who initially recommended decreased polymyxin dose and to run infusion over a longer period of time. During my evaluation, patient was receiving the lower dose, slower infusion, but did endorse perioral numbness, however, no tingling of the hands. Spoke with ID attg, Dr. Akins, given patient continues to deny  symptoms at this time and given concerns for possible polymyxin side effect, will hold off on abx at this time and continue to observe clinically. Await further antibiotic sensitivity testing for the CRE E. coli in the urine. Will continue with 3 day course of azithromycin for Salmonella and Campylobacter.     Of note, LFT's slowly uptrending, could be a delayed side effect from Augmentin course, will continue to trend for now. Hepatitis and HIV panel currently negative. Will continue to monitor but if continues to uptrend will need to consider possible additional imaging. Creatine kinase is normal at this time. Continue to monitor fever curve, will send off basic rheumatologic w/u, LDH/haptoglobin though patient without any significant lymphadenopathy on CT C/A/P imaging.     Remaining care as noted above.

## 2019-02-20 NOTE — PROGRESS NOTE ADULT - SUBJECTIVE AND OBJECTIVE BOX
CC: F/U for Fever    Saw/spoke to patient. No fevers, no chills overnight. No new complaints. No dysuria. Otherwise improved. Having periorbital and extremity numbness with poly.    Allergies  No Known Allergies    ANTIMICROBIALS:  azithromycin   Tablet 500 every 24 hours  polymyxin B IVPB [id number] every 12 hours    PE:    Vital Signs Last 24 Hrs  T(C): 37.2 (20 Feb 2019 05:51), Max: 37.3 (19 Feb 2019 14:35)  T(F): 99 (20 Feb 2019 05:51), Max: 99.2 (19 Feb 2019 14:35)  HR: 90 (20 Feb 2019 05:51) (86 - 97)  BP: 120/59 (20 Feb 2019 05:51) (120/59 - 145/73)  RR: 17 (20 Feb 2019 05:51) (17 - 18)  SpO2: 100% (20 Feb 2019 05:51) (100% - 100%)    Gen: AOx3, NAD, non-toxic, pleasant  CV: S1+S2 normal, nontachycardic  Resp: Clear bilat, no resp distress, no crackles/wheezes  Abd: Soft, nontender, +BS  Ext: No LE edema, no wounds    LABS:                        12.7   6.97  )-----------( 264      ( 20 Feb 2019 06:25 )             39.4     02-20    137  |  101  |  10  ----------------------------<  97  4.4   |  23  |  0.90    Ca    9.2      20 Feb 2019 06:25  Phos  4.0     02-20  Mg     1.9     02-20    TPro  7.0  /  Alb  3.5  /  TBili  0.5  /  DBili  x   /  AST  90<H>  /  ALT  227<H>  /  AlkPhos  122<H>  02-20    MICROBIOLOGY:    FECES  02-16-19 Salmonella, Campylobacter    URINE MIDSTREAM  02-15-19 --  --  Escherichia coli CRE    CMVPCR Log: Not Detected Assay lower limit of detection (LOD):    RADIOLOGY:    2/16 USG:    IMPRESSION:     Horseshoe kidney is not well appreciated on this examination compared to   CT abdomen and pelvis 2/15/2019. No hydronephrosis.    Liver within normal limits.

## 2019-02-21 DIAGNOSIS — R74.0 NONSPECIFIC ELEVATION OF LEVELS OF TRANSAMINASE AND LACTIC ACID DEHYDROGENASE [LDH]: ICD-10-CM

## 2019-02-21 LAB
ALBUMIN SERPL ELPH-MCNC: 3.6 G/DL — SIGNIFICANT CHANGE UP (ref 3.3–5)
ALP SERPL-CCNC: 119 U/L — SIGNIFICANT CHANGE UP (ref 40–120)
ALT FLD-CCNC: 241 U/L — HIGH (ref 4–41)
ANION GAP SERPL CALC-SCNC: 13 MMO/L — SIGNIFICANT CHANGE UP (ref 7–14)
APPEARANCE UR: CLEAR — SIGNIFICANT CHANGE UP
AST SERPL-CCNC: 84 U/L — HIGH (ref 4–40)
BACTERIA # UR AUTO: NEGATIVE — SIGNIFICANT CHANGE UP
BASOPHILS # BLD AUTO: 0.02 K/UL — SIGNIFICANT CHANGE UP (ref 0–0.2)
BASOPHILS NFR BLD AUTO: 0.3 % — SIGNIFICANT CHANGE UP (ref 0–2)
BILIRUB SERPL-MCNC: 0.5 MG/DL — SIGNIFICANT CHANGE UP (ref 0.2–1.2)
BILIRUB UR-MCNC: NEGATIVE — SIGNIFICANT CHANGE UP
BLOOD UR QL VISUAL: SIGNIFICANT CHANGE UP
BUN SERPL-MCNC: 14 MG/DL — SIGNIFICANT CHANGE UP (ref 7–23)
CALCIUM SERPL-MCNC: 9.3 MG/DL — SIGNIFICANT CHANGE UP (ref 8.4–10.5)
CHLORIDE SERPL-SCNC: 98 MMOL/L — SIGNIFICANT CHANGE UP (ref 98–107)
CK SERPL-CCNC: 43 U/L — SIGNIFICANT CHANGE UP (ref 30–200)
CO2 SERPL-SCNC: 24 MMOL/L — SIGNIFICANT CHANGE UP (ref 22–31)
COLOR SPEC: YELLOW — SIGNIFICANT CHANGE UP
CREAT SERPL-MCNC: 0.97 MG/DL — SIGNIFICANT CHANGE UP (ref 0.5–1.3)
DENV IGG+IGM PNL SER IA: < 1 — SIGNIFICANT CHANGE UP
DENV IGM SER-ACNC: < 1 — SIGNIFICANT CHANGE UP
EOSINOPHIL # BLD AUTO: 0.07 K/UL — SIGNIFICANT CHANGE UP (ref 0–0.5)
EOSINOPHIL NFR BLD AUTO: 1 % — SIGNIFICANT CHANGE UP (ref 0–6)
GLUCOSE SERPL-MCNC: 92 MG/DL — SIGNIFICANT CHANGE UP (ref 70–99)
GLUCOSE UR-MCNC: NEGATIVE — SIGNIFICANT CHANGE UP
GRAM STAIN BLOOD: SIGNIFICANT CHANGE UP
HCT VFR BLD CALC: 42 % — SIGNIFICANT CHANGE UP (ref 39–50)
HGB BLD-MCNC: 13.1 G/DL — SIGNIFICANT CHANGE UP (ref 13–17)
HYALINE CASTS # UR AUTO: NEGATIVE — SIGNIFICANT CHANGE UP
IMM GRANULOCYTES NFR BLD AUTO: 0.4 % — SIGNIFICANT CHANGE UP (ref 0–1.5)
KETONES UR-MCNC: NEGATIVE — SIGNIFICANT CHANGE UP
LDH SERPL L TO P-CCNC: 206 U/L — SIGNIFICANT CHANGE UP (ref 135–225)
LEUKOCYTE ESTERASE UR-ACNC: NEGATIVE — SIGNIFICANT CHANGE UP
LYMPHOCYTES # BLD AUTO: 2.1 K/UL — SIGNIFICANT CHANGE UP (ref 1–3.3)
LYMPHOCYTES # BLD AUTO: 31 % — SIGNIFICANT CHANGE UP (ref 13–44)
MAGNESIUM SERPL-MCNC: 1.9 MG/DL — SIGNIFICANT CHANGE UP (ref 1.6–2.6)
MCHC RBC-ENTMCNC: 26.7 PG — LOW (ref 27–34)
MCHC RBC-ENTMCNC: 31.2 % — LOW (ref 32–36)
MCV RBC AUTO: 85.7 FL — SIGNIFICANT CHANGE UP (ref 80–100)
METHOD TYPE: SIGNIFICANT CHANGE UP
MONOCYTES # BLD AUTO: 0.69 K/UL — SIGNIFICANT CHANGE UP (ref 0–0.9)
MONOCYTES NFR BLD AUTO: 10.2 % — SIGNIFICANT CHANGE UP (ref 2–14)
NEUTROPHILS # BLD AUTO: 3.87 K/UL — SIGNIFICANT CHANGE UP (ref 1.8–7.4)
NEUTROPHILS NFR BLD AUTO: 57.1 % — SIGNIFICANT CHANGE UP (ref 43–77)
NITRITE UR-MCNC: NEGATIVE — SIGNIFICANT CHANGE UP
NRBC # FLD: 0 K/UL — LOW (ref 25–125)
ORGANISM # SPEC MICROSCOPIC CNT: SIGNIFICANT CHANGE UP
ORGANISM # SPEC MICROSCOPIC CNT: SIGNIFICANT CHANGE UP
PH UR: 5.5 — SIGNIFICANT CHANGE UP (ref 5–8)
PHOSPHATE SERPL-MCNC: 4.4 MG/DL — SIGNIFICANT CHANGE UP (ref 2.5–4.5)
PLATELET # BLD AUTO: 213 K/UL — SIGNIFICANT CHANGE UP (ref 150–400)
PMV BLD: 12.8 FL — SIGNIFICANT CHANGE UP (ref 7–13)
POTASSIUM SERPL-MCNC: 4.5 MMOL/L — SIGNIFICANT CHANGE UP (ref 3.5–5.3)
POTASSIUM SERPL-SCNC: 4.5 MMOL/L — SIGNIFICANT CHANGE UP (ref 3.5–5.3)
PROT SERPL-MCNC: 7.3 G/DL — SIGNIFICANT CHANGE UP (ref 6–8.3)
PROT UR-MCNC: 20 — SIGNIFICANT CHANGE UP
RBC # BLD: 4.9 M/UL — SIGNIFICANT CHANGE UP (ref 4.2–5.8)
RBC # FLD: 14 % — SIGNIFICANT CHANGE UP (ref 10.3–14.5)
RBC CASTS # UR COMP ASSIST: SIGNIFICANT CHANGE UP (ref 0–?)
RHEUMATOID FACT SERPL-ACNC: 19 IU/ML — HIGH (ref 0–13)
SODIUM SERPL-SCNC: 135 MMOL/L — SIGNIFICANT CHANGE UP (ref 135–145)
SP GR SPEC: 1.02 — SIGNIFICANT CHANGE UP (ref 1–1.04)
SPECIMEN SOURCE: SIGNIFICANT CHANGE UP
SPECIMEN SOURCE: SIGNIFICANT CHANGE UP
SQUAMOUS # UR AUTO: SIGNIFICANT CHANGE UP
TRI STN SPEC: SIGNIFICANT CHANGE UP
UROBILINOGEN FLD QL: NORMAL — SIGNIFICANT CHANGE UP
WBC # BLD: 6.78 K/UL — SIGNIFICANT CHANGE UP (ref 3.8–10.5)
WBC # FLD AUTO: 6.78 K/UL — SIGNIFICANT CHANGE UP (ref 3.8–10.5)
WBC UR QL: SIGNIFICANT CHANGE UP (ref 0–?)

## 2019-02-21 PROCEDURE — 99233 SBSQ HOSP IP/OBS HIGH 50: CPT

## 2019-02-21 PROCEDURE — 99233 SBSQ HOSP IP/OBS HIGH 50: CPT | Mod: GC

## 2019-02-21 RX ORDER — MEROPENEM 1 G/30ML
1000 INJECTION INTRAVENOUS EVERY 8 HOURS
Qty: 0 | Refills: 0 | Status: DISCONTINUED | OUTPATIENT
Start: 2019-02-21 | End: 2019-02-23

## 2019-02-21 RX ORDER — DIPHENHYDRAMINE HCL 50 MG
25 CAPSULE ORAL ONCE
Qty: 0 | Refills: 0 | Status: COMPLETED | OUTPATIENT
Start: 2019-02-21 | End: 2019-02-21

## 2019-02-21 RX ORDER — POLYMYXIN B SULFATE 500000 [USP'U]/1
600000 INJECTION, POWDER, LYOPHILIZED, FOR SOLUTION INTRAMUSCULAR; INTRATHECAL; INTRAVENOUS; OPHTHALMIC EVERY 12 HOURS
Qty: 0 | Refills: 0 | Status: DISCONTINUED | OUTPATIENT
Start: 2019-02-21 | End: 2019-02-21

## 2019-02-21 RX ORDER — POLYMYXIN B SULFATE 500000 [USP'U]/1
600 INJECTION, POWDER, LYOPHILIZED, FOR SOLUTION INTRAMUSCULAR; INTRATHECAL; INTRAVENOUS; OPHTHALMIC EVERY 12 HOURS
Qty: 0 | Refills: 0 | Status: DISCONTINUED | OUTPATIENT
Start: 2019-02-21 | End: 2019-02-21

## 2019-02-21 RX ORDER — POLYMYXIN B SULFATE 500000 [USP'U]/1
600000 INJECTION, POWDER, LYOPHILIZED, FOR SOLUTION INTRAMUSCULAR; INTRATHECAL; INTRAVENOUS; OPHTHALMIC EVERY 12 HOURS
Qty: 0 | Refills: 0 | Status: DISCONTINUED | OUTPATIENT
Start: 2019-02-21 | End: 2019-02-22

## 2019-02-21 RX ADMIN — Medication 650 MILLIGRAM(S): at 21:47

## 2019-02-21 RX ADMIN — Medication 650 MILLIGRAM(S): at 21:45

## 2019-02-21 RX ADMIN — AZITHROMYCIN 500 MILLIGRAM(S): 500 TABLET, FILM COATED ORAL at 15:21

## 2019-02-21 RX ADMIN — MEROPENEM 100 MILLIGRAM(S): 1 INJECTION INTRAVENOUS at 21:45

## 2019-02-21 RX ADMIN — POLYMYXIN B SULFATE 250 UNIT(S): 500000 INJECTION, POWDER, LYOPHILIZED, FOR SOLUTION INTRAMUSCULAR; INTRATHECAL; INTRAVENOUS; OPHTHALMIC at 18:12

## 2019-02-21 RX ADMIN — Medication 25 MILLIGRAM(S): at 19:54

## 2019-02-21 NOTE — PROVIDER CONTACT NOTE (OTHER) - ACTION/TREATMENT ORDERED:
provider was notified, will come to speak to patient administer Benadryl and then restart antibiotics again

## 2019-02-21 NOTE — PROGRESS NOTE ADULT - PROBLEM SELECTOR PLAN 1
2 week history of fever from unclear etiology during visit to [place]. History complicated by Diarrhea and intermittent coughing. Likely 2/2 Pyelo/UTI vs. campylobacter/salmonella infection.  - FUO could be from infection given constellation of symptoms. DDx broad including Hepatitis (Viral and/or EtOH), HIV, typhoid, shigella, campylobacter, yersinia, aeromonas, listeria, GI parasites, TB, malaria, etc.  - UCx positive for E. Coli in midrange in setting of long Augmentin. Imaging concerning for pyelo. Patient currently denies any UTI Sx or pyelo Sx.  - GI PCR positive for salmonella and campylobacter.  - Malaria negative x2, BCx x3 NGTD, RVP negative,   - Dengue, Chikungunya, CMV, Ova/parasite, Quant pending  - CXR clear. CT chest clear w/ no signs of TB. removed airborne precaution.  - RUQ U/S unremarkable   - CT Chest and A/P w/o obvious infectious foci. Heterogeneity in kidney with ?pyelo. Yet patient w/o CVA tenderness or Urinary symptoms.  - ID on board  - Now on 3 day course of Azithro (Day 2/3 today) for campylobacter/salmonella  - on Polymyxin B given his travel history and sensitivity result w/ multiple drug resistance. Extra drug sensitivity test sent by ID. Had neurologic side effect with polymyxin B. discussed w/ Dr. Akins (ID attending), and will try reduced dose of Polymyxin B  -Will send basic rheumatologic work-up and LDH/haptoglobin to assess for alternate cause of fever  - will send autoimmune hepatitis panel tomorrow 2 week history of fever from unclear etiology during visit to [place]. History complicated by Diarrhea and intermittent coughing. Likely 2/2 Pyelo/UTI vs. campylobacter/salmonella infection.  - FUO could be from infection given constellation of symptoms. DDx broad including Hepatitis (Viral and/or EtOH), HIV, typhoid, shigella, campylobacter, yersinia, aeromonas, listeria, GI parasites, TB, malaria, etc.  - UCx positive for E. Coli in midrange in setting of long Augmentin. Imaging concerning for pyelo. Patient currently denies any UTI Sx or pyelo Sx.  - GI PCR positive for salmonella and campylobacter.  - Malaria negative x2, BCx x3 NGTD, RVP negative,   - Dengue, Chikungunya, CMV, Ova/parasite, Quant pending  - CXR clear. CT chest clear w/ no signs of TB. removed airborne precaution.  - RUQ U/S unremarkable   - CT Chest and A/P w/o obvious infectious foci. Heterogeneity in kidney with ?pyelo. Yet patient w/o CVA tenderness or Urinary symptoms.  - ID on board  - Now on 3 day course of Azithro (Day 3/3 today) for campylobacter/salmonella  - on Polymyxin B given his travel history and sensitivity result w/ multiple drug resistance. Extra drug sensitivity test sent by ID. Had neurologic side effect with polymyxin B. discussed w/ Dr. Akins (ID attending), and will try reduced dose of Polymyxin B  -Await results of basic rheumatology w/u; LDH is negative  - will send autoimmune hepatitis panel tomorrow

## 2019-02-21 NOTE — PROVIDER CONTACT NOTE (OTHER) - ASSESSMENT
patient states his IV is burning wants the nurse to take it out and is refusing new IV at this time states he needs a break for at least an hour

## 2019-02-21 NOTE — CHART NOTE - NSCHARTNOTEFT_GEN_A_CORE
Notified by team that patient is growing GNR in blood culture from 2/20. Discussed with ID attending, Dr. Akins, who recommended starting polymixin and meropenem (as ordered). I discussed this with patient who understands that he may experience oral numbness despite slow infusion and pt understand and accepts. All questions answered. Covering resident informed of decision and was instructed to contact ID if any further questions arise.    Riddhi Avery MD PGY3 [facility]  Pager: [phone]

## 2019-02-21 NOTE — PROGRESS NOTE ADULT - ASSESSMENT
Mr. Romero is a 29 y/o male with no PMH who presented to the ED w/ Fever of unknown origin likely 2/2 Pyelo/UTI from MDR E. coli vs. campylobacter/salmonella infection. Mr. Romero is a 27 y/o male with no PMH who presented to the ED w/ Fever of unknown origin possibly from Pyelo/UTI from MDR E. coli vs. campylobacter/salmonella infection.

## 2019-02-21 NOTE — PROGRESS NOTE ADULT - SUBJECTIVE AND OBJECTIVE BOX
Riddhi RamirezonPierre Avery, PGY-3  Pager: [phone]  After 7PM please page [phone]    Patient is a 28y old  Male who presents with a chief complaint of persistent Fever (20 Feb 2019 07:35)      INTERVAL HPI/OVERNIGHT EVENTS:  No acute overnight events. Fever yesterday at 1PM with tmax 101, no fevers overnight. Denies chest pain, abd pain, n/v/d. C/o persistent cough.     REVIEW OF SYSTEMS:  Constitutional:     [ ] negative [ ] fevers [ ] chills [ ] weight loss [ ] weight gain  HEENT:                  [ ] negative [ ] dry eyes [ ] eye irritation [ ] postnasal drip [ ] nasal congestion  CV:                         [ ] negative  [ ] chest pain [ ] orthopnea [ ] palpitations [ ] murmur  Resp:                     [ ] negative [ ] cough [ ] shortness of breath [ ] dyspnea [ ] wheezing [ ] sputum [ ] hemoptysis  GI:                          [ ] negative [ ] nausea [ ] vomiting [ ] diarrhea [ ] constipation [ ] abd pain [ ] dysphagia   :                        [ ] negative [ ] dysuria [ ] nocturia [ ] hematuria [ ] increased urinary frequency  Musculoskeletal: [ ] negative [ ] back pain [ ] myalgias [ ] arthralgias [ ] fracture  Skin:                       [ ] negative [ ] rash [ ] itch  Neurological:        [ ] negative [ ] headache [ ] dizziness [ ] syncope [ ] weakness [ ] numbness  Psychiatric:           [ ] negative [ ] anxiety [ ] depression  Endocrine:            [ ] negative [ ] diabetes [ ] thyroid problem  Heme/Lymph:      [ ] negative [ ] anemia [ ] bleeding problem  Allergic/Immune: [ ] negative [ ] itchy eyes [ ] nasal discharge [ ] hives [ ] angioedema    [ ] All other systems negative    Vital Signs Last 24 Hrs  T(C): 37.1 (21 Feb 2019 05:53), Max: 38.4 (20 Feb 2019 15:23)  T(F): 98.7 (21 Feb 2019 05:53), Max: 101.2 (20 Feb 2019 15:23)  HR: 91 (21 Feb 2019 05:53) (91 - 99)  BP: 134/58 (21 Feb 2019 05:53) (129/67 - 139/84)  BP(mean): --  RR: 18 (21 Feb 2019 05:53) (18 - 18)  SpO2: 98% (21 Feb 2019 05:53) (98% - 100%)  I&O's Summary      PHYSICAL EXAM:  General: WN/WD NAD  HEENT: PERRLA, EOMI, moist mucous membranes  Neurology: A&Ox3, nonfocal, SANTIAGO x 4  Respiratory: CTA B/L, normal respiratory effort, no wheezes, crackles, rales  CV: RRR, S1S2, no murmurs, rubs or gallops  Abdominal: Soft, NT, ND +BS, Last BM  Extremities: No edema, + peripheral pulses    LABS:                        13.1   6.78  )-----------( 213      ( 21 Feb 2019 05:30 )             42.0     Hb Trend: 13.1<--, 12.7<--, 13.8<--, 12.0<--, 12.7<--  WBC Trend: 6.78<--, 6.97<--, 6.32<--  Plt Trend: 213<--, 264<--, 261<--, 257<--, 233<--          02-21    135  |  98  |  14  ----------------------------<  92  4.5   |  24  |  0.97    Ca    9.3      21 Feb 2019 05:30  Phos  4.4     02-21  Mg     1.9     02-21    TPro  7.3  /  Alb  3.6  /  TBili  0.5  /  DBili  x   /  AST  84<H>  /  ALT  241<H>  /  AlkPhos  119  02-21    Creatine Kinase, Serum: 43 u/L (02-21-19 @ 05:30)        CAPILLARY BLOOD GLUCOSE          --    --    FECES          RADIOLOGY & ADDITIONAL TESTS:    Imaging Personally Reviewed:  [ ] YES  [ ] NO [x] No New Imaging Last 24hrs    Consultant(s) Notes Reviewed:  [x ] YES  [ ] NO    Care Discussed with Consultants/Other Providers [ x] YES  [ ] NO Riddhi RamirezonPierre Avery, PGY-3  Pager: [phone]  After 7PM please page [phone]    Patient is a 28y old  Male who presents with a chief complaint of persistent Fever (20 Feb 2019 07:35)      INTERVAL HPI/OVERNIGHT EVENTS:  No acute overnight events. Fever yesterday at 1PM with tmax 101, no fevers overnight. Denies chest pain, abd pain, n/v/d. C/o persistent cough.     REVIEW OF SYSTEMS:  Constitutional:     [ ] negative [ ] fevers [ ] chills [ ] weight loss [ ] weight gain  HEENT:                  [ ] negative [ ] dry eyes [ ] eye irritation [ ] postnasal drip [ ] nasal congestion  CV:                         [ ] negative  [ ] chest pain [ ] orthopnea [ ] palpitations [ ] murmur  Resp:                     [ ] negative [ ] cough [ ] shortness of breath [ ] dyspnea [ ] wheezing [ ] sputum [ ] hemoptysis  GI:                          [ ] negative [ ] nausea [ ] vomiting [ ] diarrhea [ ] constipation [ ] abd pain [ ] dysphagia   :                        [ ] negative [ ] dysuria [ ] nocturia [ ] hematuria [ ] increased urinary frequency  Musculoskeletal: [ ] negative [ ] back pain [ ] myalgias [ ] arthralgias [ ] fracture  Skin:                       [ ] negative [ ] rash [ ] itch  Neurological:        [ ] negative [ ] headache [ ] dizziness [ ] syncope [ ] weakness [ ] numbness  Psychiatric:           [ ] negative [ ] anxiety [ ] depression  Endocrine:            [ ] negative [ ] diabetes [ ] thyroid problem  Heme/Lymph:      [ ] negative [ ] anemia [ ] bleeding problem  Allergic/Immune: [ ] negative [ ] itchy eyes [ ] nasal discharge [ ] hives [ ] angioedema    [ ] All other systems negative    Vital Signs Last 24 Hrs  T(C): 37.1 (21 Feb 2019 05:53), Max: 38.4 (20 Feb 2019 15:23)  T(F): 98.7 (21 Feb 2019 05:53), Max: 101.2 (20 Feb 2019 15:23)  HR: 91 (21 Feb 2019 05:53) (91 - 99)  BP: 134/58 (21 Feb 2019 05:53) (129/67 - 139/84)  BP(mean): --  RR: 18 (21 Feb 2019 05:53) (18 - 18)  SpO2: 98% (21 Feb 2019 05:53) (98% - 100%)  I&O's Summary      PHYSICAL EXAM:  General: WN/WD NAD  HEENT: PERRLA, EOMI, moist mucous membranes  Neurology: A&Ox3, nonfocal, SANTIAGO x 4  Respiratory: CTA B/L, normal respiratory effort, no wheezes, crackles, rales  CV: RRR, S1S2, no murmurs, rubs or gallops  Abdominal: Soft, NT, ND +BS, Last BM  Extremities: No edema, + peripheral pulses  Skin: No rashes, lesions noted    LABS:                        13.1   6.78  )-----------( 213      ( 21 Feb 2019 05:30 )             42.0     Hb Trend: 13.1<--, 12.7<--, 13.8<--, 12.0<--, 12.7<--  WBC Trend: 6.78<--, 6.97<--, 6.32<--  Plt Trend: 213<--, 264<--, 261<--, 257<--, 233<--          02-21    135  |  98  |  14  ----------------------------<  92  4.5   |  24  |  0.97    Ca    9.3      21 Feb 2019 05:30  Phos  4.4     02-21  Mg     1.9     02-21    TPro  7.3  /  Alb  3.6  /  TBili  0.5  /  DBili  x   /  AST  84<H>  /  ALT  241<H>  /  AlkPhos  119  02-21    Creatine Kinase, Serum: 43 u/L (02-21-19 @ 05:30)    Dengue Fever Virus Antibody, IgG and IgM (02.16.19 @ 06:15)    Dengue Fever IgG: < 1.00:    Dengue Fever IgM: < 1.00    Lactate Dehydrogenase, Serum: 206 U/L (02.21.19 @ 05:30)        CAPILLARY BLOOD GLUCOSE          --    --    FECES          RADIOLOGY & ADDITIONAL TESTS:    Imaging Personally Reviewed:  [ ] YES  [ ] NO [x] No New Imaging Last 24hrs    Consultant(s) Notes Reviewed:  [x ] YES  [ ] NO    Care Discussed with Consultants/Other Providers [ x] YES  [ ] NO: Dr Akins (ID) re: plan of care

## 2019-02-21 NOTE — PROGRESS NOTE ADULT - SUBJECTIVE AND OBJECTIVE BOX
CC: F/U for Fever    Saw/spoke to patient. Continues to have intermittent fever. No new complaints. No abd pain, no N/V/D, no diarrhea. No diarrhea. No CP. No other new complaints.    Allergies  No Known Allergies    ANTIMICROBIALS:  Off    PE:    Vital Signs Last 24 Hrs  T(C): 36.9 (21 Feb 2019 15:05), Max: 37.6 (20 Feb 2019 19:24)  T(F): 98.4 (21 Feb 2019 15:05), Max: 99.6 (20 Feb 2019 19:24)  HR: 97 (21 Feb 2019 15:05) (91 - 107)  BP: 123/69 (21 Feb 2019 15:05) (98/83 - 134/58)  RR: 16 (21 Feb 2019 15:05) (16 - 18)  SpO2: 98% (21 Feb 2019 15:05) (98% - 99%)    Gen: AOx3, NAD, non-toxic, pleasant  CV: S1+S2 normal, nontachycardic  Resp: Clear bilat, no resp distress, no crackles/wheezes  Abd: Soft, nontender, +BS  Ext: No LE edema, no wounds    LABS:                        13.1   6.78  )-----------( 213      ( 21 Feb 2019 05:30 )             42.0     02-21    135  |  98  |  14  ----------------------------<  92  4.5   |  24  |  0.97    Ca    9.3      21 Feb 2019 05:30  Phos  4.4     02-21  Mg     1.9     02-21    TPro  7.3  /  Alb  3.6  /  TBili  0.5  /  DBili  x   /  AST  84<H>  /  ALT  241<H>  /  AlkPhos  119  02-21    MICROBIOLOGY:    FECES  02-16-19 Salmonella, Campy    BLOOD  02-15-19 NGTD    URINE MIDSTREAM  02-15-19 --  --  Escherichia coli CRE    RADIOLOGY:    2/16 USG:    IMPRESSION:     Horseshoe kidney is not well appreciated on this examination compared to   CT abdomen and pelvis 2/15/2019. No hydronephrosis.    Liver within normal limits.

## 2019-02-21 NOTE — PROGRESS NOTE ADULT - PROBLEM SELECTOR PLAN 3
Increasing mildly; unclear etiology- viral vs drug related vs hepatic disease  - will send autoimmune hepatitis panel with morning labs  - RUQ US reviewed; no acute pathology

## 2019-02-21 NOTE — PROGRESS NOTE ADULT - PROBLEM SELECTOR PLAN 2
Hx of Acute diarrhea in setting of fever and starting Augmentin. Now resolved.  Seems to have diarrhea after PO intake (meals) likely in the setting of Salmonella and Campylobacter infection.  -Azithromycin 500mg po Qdaily x 3 days as per ID

## 2019-02-21 NOTE — CHART NOTE - NSCHARTNOTEFT_GEN_A_CORE
Advised by medicine team that patient growing GNR in blood. Could be MDR E coli, considerations for typhoid.     While awaiting BCX PCR, start Poly B 600,000IU q 12 and Meropenem 1g q 8.    Would infuse Poly with slow infusion.    Depending on PCR result may be able to adjust antibiotic.    Appreciate notification by and discussion with medicine team.    Mj Akins MD  Pager [phone]  After 5pm and on weekends call [phone]

## 2019-02-22 LAB
ALBUMIN SERPL ELPH-MCNC: 3.6 G/DL — SIGNIFICANT CHANGE UP (ref 3.3–5)
ALP SERPL-CCNC: 119 U/L — SIGNIFICANT CHANGE UP (ref 40–120)
ALT FLD-CCNC: 226 U/L — HIGH (ref 4–41)
ANION GAP SERPL CALC-SCNC: 15 MMO/L — HIGH (ref 7–14)
AST SERPL-CCNC: 79 U/L — HIGH (ref 4–40)
BILIRUB SERPL-MCNC: 0.5 MG/DL — SIGNIFICANT CHANGE UP (ref 0.2–1.2)
BUN SERPL-MCNC: 15 MG/DL — SIGNIFICANT CHANGE UP (ref 7–23)
CALCIUM SERPL-MCNC: 9 MG/DL — SIGNIFICANT CHANGE UP (ref 8.4–10.5)
CHLORIDE SERPL-SCNC: 101 MMOL/L — SIGNIFICANT CHANGE UP (ref 98–107)
CO2 SERPL-SCNC: 21 MMOL/L — LOW (ref 22–31)
CREAT SERPL-MCNC: 0.92 MG/DL — SIGNIFICANT CHANGE UP (ref 0.5–1.3)
GLUCOSE SERPL-MCNC: 115 MG/DL — HIGH (ref 70–99)
HCT VFR BLD CALC: 38.3 % — LOW (ref 39–50)
HEV AB FLD QL: NEGATIVE — SIGNIFICANT CHANGE UP
HGB BLD-MCNC: 12.2 G/DL — LOW (ref 13–17)
IGA FLD-MCNC: 208 MG/DL — SIGNIFICANT CHANGE UP (ref 70–400)
IGG FLD-MCNC: 1179 MG/DL — SIGNIFICANT CHANGE UP (ref 700–1600)
IGM SERPL-MCNC: 291 MG/DL — HIGH (ref 40–230)
MAGNESIUM SERPL-MCNC: 2 MG/DL — SIGNIFICANT CHANGE UP (ref 1.6–2.6)
MCHC RBC-ENTMCNC: 26.3 PG — LOW (ref 27–34)
MCHC RBC-ENTMCNC: 31.9 % — LOW (ref 32–36)
MCV RBC AUTO: 82.7 FL — SIGNIFICANT CHANGE UP (ref 80–100)
NRBC # FLD: 0 K/UL — LOW (ref 25–125)
ORGANISM # SPEC MICROSCOPIC CNT: SIGNIFICANT CHANGE UP
PHOSPHATE SERPL-MCNC: 4.3 MG/DL — SIGNIFICANT CHANGE UP (ref 2.5–4.5)
PLASMODIUM AG BLD QL: SIGNIFICANT CHANGE UP
PLATELET # BLD AUTO: 282 K/UL — SIGNIFICANT CHANGE UP (ref 150–400)
PMV BLD: 10.9 FL — SIGNIFICANT CHANGE UP (ref 7–13)
POTASSIUM SERPL-MCNC: 4.2 MMOL/L — SIGNIFICANT CHANGE UP (ref 3.5–5.3)
POTASSIUM SERPL-SCNC: 4.2 MMOL/L — SIGNIFICANT CHANGE UP (ref 3.5–5.3)
PROCALCITONIN SERPL-MCNC: 0.19 NG/ML — HIGH (ref 0.02–0.1)
PROT SERPL-MCNC: 7.2 G/DL — SIGNIFICANT CHANGE UP (ref 6–8.3)
RBC # BLD: 4.63 M/UL — SIGNIFICANT CHANGE UP (ref 4.2–5.8)
RBC # FLD: 13.9 % — SIGNIFICANT CHANGE UP (ref 10.3–14.5)
SODIUM SERPL-SCNC: 137 MMOL/L — SIGNIFICANT CHANGE UP (ref 135–145)
WBC # BLD: 7.44 K/UL — SIGNIFICANT CHANGE UP (ref 3.8–10.5)
WBC # FLD AUTO: 7.44 K/UL — SIGNIFICANT CHANGE UP (ref 3.8–10.5)

## 2019-02-22 PROCEDURE — 99233 SBSQ HOSP IP/OBS HIGH 50: CPT

## 2019-02-22 PROCEDURE — 99233 SBSQ HOSP IP/OBS HIGH 50: CPT | Mod: GC

## 2019-02-22 RX ADMIN — MEROPENEM 100 MILLIGRAM(S): 1 INJECTION INTRAVENOUS at 21:12

## 2019-02-22 RX ADMIN — MEROPENEM 100 MILLIGRAM(S): 1 INJECTION INTRAVENOUS at 13:45

## 2019-02-22 RX ADMIN — MEROPENEM 100 MILLIGRAM(S): 1 INJECTION INTRAVENOUS at 05:38

## 2019-02-22 RX ADMIN — POLYMYXIN B SULFATE 250 UNIT(S): 500000 INJECTION, POWDER, LYOPHILIZED, FOR SOLUTION INTRAMUSCULAR; INTRATHECAL; INTRAVENOUS; OPHTHALMIC at 06:24

## 2019-02-22 NOTE — PROGRESS NOTE ADULT - SUBJECTIVE AND OBJECTIVE BOX
Luc Hurtado Rosio  PGY-1  Pager: [phone]    Patient is a 28y old  Male who presents with a chief complaint of persistent Fever (2019 12:35)      SUBJECTIVE / OVERNIGHT EVENTS:    MEDICATIONS  (STANDING):  influenza   Vaccine 0.5 milliLiter(s) IntraMuscular once  meropenem  IVPB 1000 milliGRAM(s) IV Intermittent every 8 hours  polymyxin B IVPB 937542 Unit(s) IV Intermittent every 12 hours    MEDICATIONS  (PRN):  acetaminophen   Tablet .. 650 milliGRAM(s) Oral every 6 hours PRN Temp greater or equal to 38C (100.4F), Mild Pain (1 - 3), Moderate Pain (4 - 6)      T(C): 36.8 (19 @ 05:37), Max: 38.1 (19 @ 21:00)  HR: 88 (19 @ 05:37) (88 - 107)  BP: 124/68 (19 @ 05:37) (98/83 - 132/77)  RR: 18 (19 @ 05:37) (16 - 18)  SpO2: 97% (19 @ 05:37) (97% - 99%)  CAPILLARY BLOOD GLUCOSE        I&O's Summary      PHYSICAL EXAM:  GENERAL: NAD, well-developed  HEAD:  Atraumatic, Normocephalic  EYES: EOMI, PERRLA, conjunctiva and sclera clear  NECK: Supple, No JVD  CHEST/LUNG: Clear to auscultation bilaterally; No wheeze  HEART: Regular rate and rhythm; No murmurs, rubs, or gallops  ABDOMEN: Soft, Nontender, Nondistended; Bowel sounds present  EXTREMITIES:  2+ Peripheral Pulses, No clubbing, cyanosis, or edema  PSYCH: AAOx3  NEUROLOGY: non-focal  SKIN: No rashes or lesions    LABS:                        13.1   6.78  )-----------( 213      ( 2019 05:30 )             42.0         135  |  98  |  14  ----------------------------<  92  4.5   |  24  |  0.97    Ca    9.3      2019 05:30  Phos  4.4     -21  Mg     1.9         TPro  7.3  /  Alb  3.6  /  TBili  0.5  /  DBili  x   /  AST  84<H>  /  ALT  241<H>  /  AlkPhos  119        CARDIAC MARKERS ( 2019 05:30 )  x     / x     / 43 u/L / x     / x          Urinalysis Basic - ( 2019 15:09 )    Color: YELLOW / Appearance: CLEAR / S.022 / pH: 5.5  Gluc: NEGATIVE / Ketone: NEGATIVE  / Bili: NEGATIVE / Urobili: NORMAL   Blood: TRACE / Protein: 20 / Nitrite: NEGATIVE   Leuk Esterase: NEGATIVE / RBC: 0-2 / WBC 0-2   Sq Epi: OCC / Non Sq Epi: x / Bacteria: NEGATIVE        RADIOLOGY & ADDITIONAL TESTS:    Imaging Personally Reviewed:    Consultant(s) Notes Reviewed:      Care Discussed with Consultants/Other Providers: Luc Hurtado Rosio  PGY-1  Pager: [phone]    Patient is a 28y old  Male who presents with a chief complaint of persistent Fever (2019 12:35)      SUBJECTIVE / OVERNIGHT EVENTS:  No acute events overnight.  He spiked fever overnight yet subjectively doing well just as yesterday.  Diarrhea completely resolved. PO intake is good and having good UOP without dysuria/hematuria.  Denies CP, palpitation, SOB, dizziness, lightheadedness, visual disturbance.      MEDICATIONS  (STANDING):  influenza   Vaccine 0.5 milliLiter(s) IntraMuscular once  meropenem  IVPB 1000 milliGRAM(s) IV Intermittent every 8 hours  polymyxin B IVPB 332112 Unit(s) IV Intermittent every 12 hours    MEDICATIONS  (PRN):  acetaminophen   Tablet .. 650 milliGRAM(s) Oral every 6 hours PRN Temp greater or equal to 38C (100.4F), Mild Pain (1 - 3), Moderate Pain (4 - 6)      T(C): 36.8 (19 @ 05:37), Max: 38.1 (19 @ 21:00)  HR: 88 (19 @ 05:37) (88 - 107)  BP: 124/68 (19 @ 05:37) (98/83 - 132/77)  RR: 18 (19 @ 05:37) (16 - 18)  SpO2: 97% (19 @ 05:37) (97% - 99%)  CAPILLARY BLOOD GLUCOSE        I&O's Summary      PHYSICAL EXAM:  Constitutional: NAD.  HEENT: AT/NC, EOMI, MMM, supple neck  Respiratory: CTAB. equal aeration bilaterally. no wheezing, no crackes, no rhonchi. no increase in WOB  Cardiovascular: RRR, no M/R/G. 2+ distal pulses. Cap refill ~ 3 seconds  Gastrointestinal: soft; NT/ND, +BS, no rebounding tenderness or guarding.   Genitourinary: not examined.  Extremities: no cyanosis; no pedal edema, non-tender to palpation, DP and Radial pulses intact.  Neurological: A&Ox 3; epicritic and protopathic sensation intact: CN nerves grossly intact.   MSK/Back: no deformities. Active ROM intact; strength intact, ambulating  Psychiatric: normal mood/affect.       LABS:                        13.1   6.78  )-----------( 213      ( 2019 05:30 )             42.0     -    135  |  98  |  14  ----------------------------<  92  4.5   |  24  |  0.97    Ca    9.3      2019 05:30  Phos  4.4       Mg     1.9         TPro  7.3  /  Alb  3.6  /  TBili  0.5  /  DBili  x   /  AST  84<H>  /  ALT  241<H>  /  AlkPhos  119        CARDIAC MARKERS ( 2019 05:30 )  x     / x     / 43 u/L / x     / x          Urinalysis Basic - ( 2019 15:09 )    Color: YELLOW / Appearance: CLEAR / S.022 / pH: 5.5  Gluc: NEGATIVE / Ketone: NEGATIVE  / Bili: NEGATIVE / Urobili: NORMAL   Blood: TRACE / Protein: 20 / Nitrite: NEGATIVE   Leuk Esterase: NEGATIVE / RBC: 0-2 / WBC 0-2   Sq Epi: OCC / Non Sq Epi: x / Bacteria: NEGATIVE        RADIOLOGY & ADDITIONAL TESTS:    Imaging Personally Reviewed: NA    Consultant(s) Notes Reviewed:  ID    Care Discussed with Consultants/Other Providers: ID Luc Hurtado Rosio  PGY-1  Pager: [phone]    Patient is a 28y old  Male who presents with a chief complaint of persistent Fever (21 Feb 2019 12:35)      SUBJECTIVE / OVERNIGHT EVENTS:  No acute events overnight.  He spiked fever overnight yet subjectively doing well just as yesterday.  Diarrhea completely resolved. PO intake is good and having good UOP without dysuria/hematuria.  Denies CP, palpitation, SOB, dizziness, lightheadedness, visual disturbance.      MEDICATIONS  (STANDING):  influenza   Vaccine 0.5 milliLiter(s) IntraMuscular once  meropenem  IVPB 1000 milliGRAM(s) IV Intermittent every 8 hours  polymyxin B IVPB [id number] Unit(s) IV Intermittent every 12 hours    MEDICATIONS  (PRN):  acetaminophen   Tablet .. 650 milliGRAM(s) Oral every 6 hours PRN Temp greater or equal to 38C (100.4F), Mild Pain (1 - 3), Moderate Pain (4 - 6)      T(C): 36.8 (02-22-19 @ 05:37), Max: 38.1 (02-21-19 @ 21:00)  HR: 88 (02-22-19 @ 05:37) (88 - 107)  BP: 124/68 (02-22-19 @ 05:37) (98/83 - 132/77)  RR: 18 (02-22-19 @ 05:37) (16 - 18)  SpO2: 97% (02-22-19 @ 05:37) (97% - 99%)  CAPILLARY BLOOD GLUCOSE        I&O's Summary      PHYSICAL EXAM:  Constitutional: NAD.  HEENT: AT/NC, EOMI, MMM, supple neck  Respiratory: CTAB. equal aeration bilaterally. no wheezing, no crackes, no rhonchi. no increase in WOB  Cardiovascular: RRR, no M/R/G. 2+ distal pulses. Cap refill ~ 3 seconds  Gastrointestinal: soft; NT/ND, +BS, no rebounding tenderness or guarding.   Genitourinary: not examined.  Extremities: no cyanosis; no pedal edema, non-tender to palpation, DP and Radial pulses intact.  Neurological: A&Ox 3; epicritic and protopathic sensation intact: CN nerves grossly intact.   MSK/Back: no deformities. Active ROM intact; strength intact, ambulating  Psychiatric: normal mood/affect.       LABS:               02-22    137  |  101  |  15  ----------------------------<  115<H>  4.2   |  21<L>  |  0.92    Ca    9.0      22 Feb 2019 06:30  Phos  4.3     02-22  Mg     2.0     02-22    TPro  7.2  /  Alb  3.6  /  TBili  0.5  /  DBili  x   /  AST  79<H>  /  ALT  226<H>  /  AlkPhos  119  02-22                          12.2   7.44  )-----------( 282      ( 22 Feb 2019 06:30 )             38.3     Culture - Blood (02.20.19 @ 16:29)    Culture - Blood:   ***Blood Panel PCR results on this specimen are available  approximately 3 hours after the Gram stain result***  Gram stain, PCR, and/or culture results may not always  correspond due to difference in methodologies  ------------------------------------------------------------  This PCR assay was performed using [employer].  The  following targets are tested for:  Enterococcus, vancomycin  resistant enterococci, Listeria monocytogenes,  coagulase  negative staphylococci, S. aureus, methicillin resistant S.  aureus, Streptococcus agalactiae (Group B), S. pneumoniae,  S. pyogenes (Group A), Acinetobacter baumannii, Enterobacter  cloacae, E. coli, Klebsiella oxytoca, K. pneumoniae, Proteus  sp., Serratia marcescens, Haemophilus influenzae, Neisseria  meningitidis, Pseudomonas aeruginosa, Candida albicans, C.  glabrata, C. krusei, C. parapsilosis, C. tropicalis and the  KPC resistance gene.  **NOTE: Due to technical problems, Proteus sp. will NOT be  reported as part of the BCID paneluntil further notice.    Culture - Blood:   Insufficient growth, culture re-incubated.    -  Multidrug (KPC pos) resistant organism: - NOTDETECT ASHLEY    -  Staphylococcus aureus: - NOTDETECT ASHLEY    -  Methicillin resistant Staphylococcus aureus (MRSA): - NOTDETECT ASHLEY    -  Coagulase negative Staphylococcus: - NOTDETECT ASHLEY    -  Enterococcus species: - NOTDETECT ASHLEY    -  Vancomycin resistant Enterococcus sp.: - NOTDETECT ASHLEY    -  Escherichia coli: - NOTDETECT ASHLEY    -  Klebsiella oxytoca: - NOTDETECT ASHLEY    -  Klebsiella pneumoniae: - NOTDETECT ASHLEY    -  Serratia marcescens: - NOTDETECT ASHLEY    -  Haemophilus influenzae: - NOTDETECT ASHLEY    -  Listeria monocytogenes: - NOTDETECT ASHLEY    -  Neisseria meningitidis: - NOTDETECT ASHLEY    -  Pseudomonas aeruginosa: - NOTDETECT ASHLEY    -  Acinetobacter baumanii: - NOTDETECT ASHLEY    -  Enterobacter cloacae complex: - NOTDETECT ASHLEY    -  Streptococcus sp. (Not Grp A, B or S pneumoniae): - NOTDETECT ASHLEY    -  Streptococcus agalactiae (Group B): - NOTDETECT ASHLEY    -  Streptococcus pyogenes (Group A): - NOTDETECT ASHLEY    -  Streptococcus pneumoniae: - NOTDETECT ASHLEY    -  Candida albicans: - NOTDETECT ASHLEY    -  Candida glabrata: - NOTDETECT ASHLEY    -  Reanna krusei: - NOTDETECT ASHLEY    -  Candida parapsilosis: - NOTDETECT ASHLEY    -  Candida tropicalis: - NOTDETECT ASHLEY    Specimen Source: BLOOD VENOUS    Organism: BLOOD CULTURE PCR  ***Blood Panel PCR results on this specimen are available  approximately 3 hours after the Gram stain result***  Gram stain, PCR, and/or culture results may not always  correspond due to difference in methodologies  ------------------------------------------------------------  This PCR assay was performed using [employer].  The  following targets are tested for:  Enterococcus, vancomycin  resistant enterococci, Listeria monocytogenes,  coagulase  negative staphylococci, S. aureus, methicillin resistant S.  aureus, Streptococcus agalactiae (Group B), S. pneumoniae,  S. pyogenes (Group A), Acinetobacter baumannii, Enterobacter  cloacae, E. coli, Klebsiella oxytoca, K. pneumoniae, Proteus  sp., Serratia marcescens, Haemophilus influenzae, Neisseria  meningitidis, Pseudomonas aeruginosa, Candida albicans, C.  glabrata, C. krusei, C. parapsilosis, C. tropicalis and the  KPC resistance gene.  **NOTE: Due to technical problems, Proteus sp. will NOT be  reported as part of the BCID panel until further notice.    Gram Stain Blood:   GNR^Gram Neg Rods  AFTER: 18 HOURS INCUBATION  BOTTLE: AEROBIC BOTTLE    Gram Stain Blood:   ***** CRITICAL RESULT *****  PERSON CALLED / READ-BACK: NETTIE LANDRUM RN / Y  DATE / TIME CALLED: 02/21/19 1623  CALLED BY: MICHELLE WHITLEY                *******************************                * This is an appended result. *  *******************************  A prior result that was reported as final has been changed.  GNR^Gram Neg Rods  AFTER: 18 HOURS INCUBATION  BOTTLE: AEROBIC   ANAEROBIC BOTTLES    Organism Identification: BLOOD CULTURE PCR    Method Type: PCR      RADIOLOGY & ADDITIONAL TESTS:    Imaging Personally Reviewed: NA    Consultant(s) Notes Reviewed:  ID    Care Discussed with Consultants/Other Providers: ID (Dr. Akins) re: blood culture results

## 2019-02-22 NOTE — PROVIDER CONTACT NOTE (OTHER) - ASSESSMENT
patient temperature was 101.2 heart rate 107 patient feels cool to touch. patient states he just had some hot tea temp was reassessed 15 min later and it is 99.4

## 2019-02-22 NOTE — PROGRESS NOTE ADULT - PROBLEM SELECTOR PLAN 2
Hx of Acute diarrhea in setting of fever and starting Augmentin. Now resolved.  Seems to have diarrhea after PO intake (meals) likely in the setting of Salmonella and Campylobacter infection.  -Azithromycin 500mg po Qdaily x 3 days as per ID Hx of Acute diarrhea in setting of fever and starting Augmentin. Now resolved.  Seems to have diarrhea after PO intake (meals) likely in the setting of Salmonella and Campylobacter infection.  - s/p Azithromycin 500mg po x 3 days.

## 2019-02-22 NOTE — PROGRESS NOTE ADULT - SUBJECTIVE AND OBJECTIVE BOX
CC: F/U for GNR Bacteremia    Saw/spoke to patient. Low grade fever. Found to have GNR bacteremia in the interim. Feels the same, asking to go home. No new complaints.    Allergies  No Known Allergies    ANTIMICROBIALS:  meropenem  IVPB 1000 every 8 hours  polymyxin B IVPB [id number] every 12 hours    PE:    Vital Signs Last 24 Hrs  T(C): 36.8 (2019 05:37), Max: 38.1 (2019 21:00)  T(F): 98.3 (2019 05:37), Max: 100.6 (2019 21:00)  HR: 88 (2019 05:37) (88 - 107)  BP: 124/68 (2019 05:37) (98/83 - 132/77)  RR: 18 (2019 05:37) (16 - 18)  SpO2: 97% (2019 05:37) (97% - 99%)    Gen: AOx3, NAD, non-toxic, pleasant  CV: S1+S2 normal, nontachycardic  Resp: Clear bilat, no resp distress, no crackles/wheezes  Abd: Soft, nontender, +BS  Ext: No LE edema, no wounds    LABS:                        12.2   7.44  )-----------( 282      ( 2019 06:30 )             38.3     -    137  |  101  |  15  ----------------------------<  115<H>  4.2   |  21<L>  |  0.92    Ca    9.0      2019 06:30  Phos  4.3       Mg     2.0         TPro  7.2  /  Alb  3.6  /  TBili  0.5  /  DBili  x   /  AST  79<H>  /  ALT  226<H>  /  AlkPhos  119  22    Urinalysis Basic - ( 2019 15:09 )    Color: YELLOW / Appearance: CLEAR / S.022 / pH: 5.5  Gluc: NEGATIVE / Ketone: NEGATIVE  / Bili: NEGATIVE / Urobili: NORMAL   Blood: TRACE / Protein: 20 / Nitrite: NEGATIVE   Leuk Esterase: NEGATIVE / RBC: 0-2 / WBC 0-2   Sq Epi: OCC / Non Sq Epi: x / Bacteria: NEGATIVE    MICROBIOLOGY:    BLOOD PERIPHERAL  19 --  --  BLOOD CULTURE PCR GNR, neg PCR    FECES  19 Salmonella, Campy    URINE MIDSTREAM  02-15-19 --  --  Escherichia coli CRE    (otherwise reviewed)    RADIOLOGY:     USG:    IMPRESSION:     [place]hoe kidney is not well appreciated on this examination compared to   CT abdomen and pelvis 2/15/2019. No hydronephrosis.    Liver within normal limits.

## 2019-02-22 NOTE — PROGRESS NOTE ADULT - PROBLEM SELECTOR PLAN 1
2 week history of fever from unclear etiology during visit to [place]. History complicated by Diarrhea and intermittent coughing. Likely 2/2 Pyelo/UTI vs. campylobacter/salmonella infection.  - FUO could be from infection given constellation of symptoms. DDx broad including Hepatitis (Viral and/or EtOH), HIV, typhoid, shigella, campylobacter, yersinia, aeromonas, listeria, GI parasites, TB, malaria, etc.  - UCx positive for E. Coli in midrange in setting of long Augmentin. Imaging concerning for pyelo. Patient currently denies any UTI Sx or pyelo Sx.  - GI PCR positive for salmonella and campylobacter.  - Malaria negative x2, BCx x3 NGTD, RVP negative,   - Dengue, Chikungunya, CMV, Ova/parasite, Quant pending  - CXR clear. CT chest clear w/ no signs of TB. removed airborne precaution.  - RUQ U/S unremarkable   - CT Chest and A/P w/o obvious infectious foci. Heterogeneity in kidney with ?pyelo. Yet patient w/o CVA tenderness or Urinary symptoms.  - ID on board  - Now on 3 day course of Azithro (Day 3/3 today) for campylobacter/salmonella  - on Polymyxin B given his travel history and sensitivity result w/ multiple drug resistance. Extra drug sensitivity test sent by ID. Had neurologic side effect with polymyxin B. discussed w/ Dr. Akins (ID attending), and will try reduced dose of Polymyxin B  -Await results of basic rheumatology w/u; LDH is negative  - will send autoimmune hepatitis panel tomorrow 2 week history of fever from unclear etiology during visit to [place]. History complicated by Diarrhea and intermittent coughing. Likely from GNR bacteremia 2/2 Pyelo/UTI vs. campylobacter/salmonella infection.  - FUO could be from infection given constellation of symptoms. DDx broad including Hepatitis (Viral and/or EtOH), HIV, typhoid, shigella, campylobacter, yersinia, aeromonas, listeria, GI parasites, TB, malaria, etc.  - repeat BCx (2/20) positive for GNR.  - UCx positive for E. Coli in midrange in setting of long Augmentin. Imaging concerning for pyelo. Patient currently denies any UTI Sx or pyelo Sx.  - GI PCR positive for salmonella and campylobacter.   - started on meropenem 1g q8h and polymyxin B 600K q12h.  - s/p 3 day course of Azithro for campylobacter/salmonella  - on Polymyxin B given his travel history and sensitivity result w/ multiple drug resistance. Extra drug sensitivity test sent by ID. Had neurologic side effect with polymyxin B. discussed w/ Dr. Akins (ID attending), and will try reduced dose of Polymyxin B  - Stool O/P negative. Malaria negative x2, RVP negative, Dengue negative. Quant negative  - Chikungunya, CMV pending  - CXR clear. CT chest clear w/ no signs of TB. removed airborne precaution.  - RUQ U/S unremarkable   - CT Chest and A/P w/o obvious infectious foci. Heterogeneity in kidney with ?pyelo. Yet patient w/o CVA tenderness or Urinary symptoms.  - ID on board  - LDH and CK normal. RF mildly elevated.   - autoimmune hepatitis panel pending. 2 week history of fever from unclear etiology during visit to [place]. History complicated by Diarrhea and intermittent coughing. Likely from GNR bacteremia 2/2 Pyelo/UTI vs. campylobacter/salmonella infection.  - FUO could be from infection given constellation of symptoms. DDx broad including Hepatitis (Viral and/or EtOH), HIV, typhoid, shigella, campylobacter, yersinia, aeromonas, listeria, GI parasites, TB, malaria, etc.  - repeat BCx (2/20) positive for GNR.  - UCx positive for E. Coli in midrange in setting of long Augmentin. Imaging concerning for pyelo. Patient currently denies any UTI Sx or pyelo Sx.  - GI PCR positive for salmonella and campylobacter.   - s/p 3 day course of Azithro for campylobacter/salmonella  - started on meropenem 1g q8h and polymyxin B 600K q12h.  - on Polymyxin B given his travel history and sensitivity result w/ multiple drug resistance. Extra drug sensitivity test sent by ID. Had neurologic side effect with polymyxin B. discussed w/ Dr. Akins (ID attending), and will try reduced dose of Polymyxin B  - Stool O/P negative. Malaria negative x2, RVP negative, Dengue negative. Quant negative  - Chikungunya, CMV pending  - CXR clear. CT chest clear w/ no signs of TB. removed airborne precaution.  - RUQ U/S unremarkable   - CT Chest and A/P w/o obvious infectious foci. Heterogeneity in kidney with ?pyelo. Yet patient w/o CVA tenderness or Urinary symptoms.  - ID on board  - LDH and CK normal. RF mildly elevated.   - autoimmune hepatitis panel pending.  - TTE 2 week history of fever from unclear etiology during visit to [place]. History complicated by Diarrhea and intermittent coughing. Likely from GNR bacteremia 2/2 Pyelo/UTI vs. campylobacter/salmonella infection.  - FUO could be from infection given constellation of symptoms. DDx broad including Hepatitis (Viral and/or EtOH), HIV, typhoid, shigella, campylobacter, yersinia, aeromonas, listeria, GI parasites, TB, malaria, etc.  - repeat BCx (2/20) positive for GNR.  - UCx positive for E. Coli in midrange in setting of long Augmentin. Imaging concerning for pyelo. Patient currently denies any UTI Sx or pyelo Sx.  - GI PCR positive for salmonella and campylobacter.   - s/p 3 day course of Azithro for campylobacter/salmonella  - started on meropenem 1g q8h and polymyxin B 600K q12h.  - on Polymyxin B given his travel history and sensitivity result w/ multiple drug resistance. Extra drug sensitivity test sent by ID. Had neurologic side effect with polymyxin B. discussed w/ Dr. Akins (ID attending), and will try reduced dose of Polymyxin B  - Stool O/P negative. Malaria negative x2, RVP negative, Dengue negative. Quant negative  - Brucella, Chikungunya, CMV, Hep E pending  - CXR clear. CT chest clear w/ no signs of TB. removed airborne precaution.  - RUQ U/S unremarkable   - CT Chest and A/P w/o obvious infectious foci. Heterogeneity in kidney with ?pyelo. Yet patient w/o CVA tenderness or Urinary symptoms.  - ID on board  - LDH and CK normal. RF mildly elevated.   - autoimmune hepatitis panel pending.  - TTE 2 week history of fever from unclear etiology during visit to [place]. History complicated by Diarrhea and intermittent coughing. Likely from GNR bacteremia 2/2 Pyelo/UTI vs. campylobacter/salmonella infection.y  - repeat BCx (2/20) positive for GNR.  - UCx positive for E. Coli in midrange in setting of long Augmentin. Imaging concerning for pyelo. Patient currently denies any UTI Sx or pyelo Sx.  - GI PCR positive for salmonella and campylobacter.   - s/p 3 day course of Azithro for campylobacter/salmonella  - started on meropenem 1g q8h and polymyxin B 600K q12h.  - on Polymyxin B given his travel history and sensitivity result w/ multiple drug resistance. Extra drug sensitivity test sent by ID. Had neurologic side effect with polymyxin B. discussed w/ Dr. Akins (ID attending), and will try reduced dose of Polymyxin B  - Stool O/P negative. Malaria negative x2, RVP negative, Dengue negative. Quant negative  - Brucella, Chikungunya, CMV, Hep E pending  - CXR clear. CT chest clear w/ no signs of TB. removed airborne precaution.  - RUQ U/S unremarkable   - CT Chest and A/P w/o obvious infectious foci. Heterogeneity in kidney with ?pyelo. Yet patient w/o CVA tenderness or Urinary symptoms.  - ID on board  - LDH and CK normal. RF mildly elevated.   - autoimmune hepatitis panel pending.  - TTE

## 2019-02-22 NOTE — PROGRESS NOTE ADULT - ATTENDING COMMENTS
Patient seen and examined at bedside this AM. Case d/w HS3 on rounds. Patient reports he feels fine and has no complaints. Denies any nausea, vomiting, diarrhea, abdominal pain, dysuria, urinary frequency, cough, CP, SOB. Blood culture yesterday grew gram negative rods but PCR unable to identify organism. Spoke with lab, identification will be available by Sunday at the latest. Differential includes possible Salmonella vs CRE E. coli found in the urine (though PCR for E. coli is negative). If patient were to have Salmonella, especially Salmonella typhae, would also explain patient's continued low grade transaminitis. Repeat cultures sent given patient spiking high temps previously through Zosyn and meropenem. Dc planning pending C/S of GNR in blood. Auto-immune work-up also sent given persistent transaminitis.     Continue polymyxin and meropenem for now. Still has some oral tingling with Polymyxin slow-infusion but improved from prior. Given possibility of CRE E. coli, we will continue polymyxin for now. Fever curve is downtrending, Cofo=823.6.

## 2019-02-22 NOTE — PROGRESS NOTE ADULT - ASSESSMENT
Mr. Romero is a 29 y/o male with no PMH who presented to the ED w/ Fever of unknown origin possibly from Pyelo/UTI from MDR E. coli vs. campylobacter/salmonella infection. Mr. Romero is a 29 y/o male with no PMH who presented to the ED w/ Fever of unknown origin likely from GNR bacteremia 2/2 Pyelo/UTI from MDR E. coli vs. campylobacter/salmonella infection.

## 2019-02-22 NOTE — PROGRESS NOTE ADULT - ASSESSMENT
27 yo M no PMH recent travel to [place] presenting with febrile illness.  Fever, tachycardia  Diarrhea, chills, mild dry cough--no other symptoms noted  Malaria smear neg  Treated with PO augmentin as outpatient in [facility]  Now suspected infectious diarrhea vs pyelonephritis as cause of symptoms  UCX with CRE E coli R Zosyn (low CFU)  GI PCR with Salmonella, Campylobacter  BCX GNR, PCR neg--? typhoid vs other unspec GNR  Still fever  Overall, persistent fever, foreign travel, tachycardia, intra-abd infection/campylobacter, GNR bacteremia  - Meropenem 1g q 8  - Poly 600,000U q 12 (continue if patient able to tolerate for now; CRE E coli--marker for other resistant organisms)  - F/U pending BCX results for ID/S  - Tailor antibiotics based on culture result    Mj Akins MD  Pager [phone]  After 5pm and on weekends call [phone]

## 2019-02-22 NOTE — PROGRESS NOTE ADULT - PROBLEM SELECTOR PLAN 3
Increasing mildly; unclear etiology- viral vs drug related vs hepatic disease  - will send autoimmune hepatitis panel with morning labs  - RUQ US reviewed; no acute pathology Increasing mildly; unclear etiology- viral vs drug related vs hepatic disease vs. sepsis 2/2 bacteremia  - will send autoimmune hepatitis panel with morning labs  - RUQ US reviewed; no acute pathology Increased mildly from admission: unclear etiology- viral vs drug related vs hepatic disease vs. sepsis 2/2 bacteremia  - will send autoimmune hepatitis panel with morning labs  - RUQ US reviewed; no acute pathology

## 2019-02-23 LAB
-  AMPICILLIN: SIGNIFICANT CHANGE UP
-  CANDIDA ALBICANS: SIGNIFICANT CHANGE UP
-  CANDIDA GLABRATA: SIGNIFICANT CHANGE UP
-  CANDIDA KRUSEI: SIGNIFICANT CHANGE UP
-  CANDIDA PARAPSILOSIS: SIGNIFICANT CHANGE UP
-  CANDIDA TROPICALIS: SIGNIFICANT CHANGE UP
-  CEFTRIAXONE: SIGNIFICANT CHANGE UP
-  CIPROFLOXACIN: SIGNIFICANT CHANGE UP
-  COAGULASE NEGATIVE STAPHYLOCOCCUS: SIGNIFICANT CHANGE UP
-  K. PNEUMONIAE GROUP: SIGNIFICANT CHANGE UP
-  KPC RESISTANCE GENE: SIGNIFICANT CHANGE UP
-  STREPTOCOCCUS SP. (NOT GRP A, B OR S PNEUMONIAE): SIGNIFICANT CHANGE UP
-  TRIMETHOPRIM/SULFAMETHOXAZOLE: SIGNIFICANT CHANGE UP
A BAUMANNII DNA SPEC QL NAA+PROBE: SIGNIFICANT CHANGE UP
ALBUMIN SERPL ELPH-MCNC: 3.4 G/DL — SIGNIFICANT CHANGE UP (ref 3.3–5)
ALP SERPL-CCNC: 105 U/L — SIGNIFICANT CHANGE UP (ref 40–120)
ALT FLD-CCNC: 210 U/L — HIGH (ref 4–41)
ANION GAP SERPL CALC-SCNC: 13 MMO/L — SIGNIFICANT CHANGE UP (ref 7–14)
AST SERPL-CCNC: 66 U/L — HIGH (ref 4–40)
BACTERIA BLD CULT: SIGNIFICANT CHANGE UP
BACTERIA BLD CULT: SIGNIFICANT CHANGE UP
BACTERIA UR CULT: SIGNIFICANT CHANGE UP
BILIRUB SERPL-MCNC: 0.5 MG/DL — SIGNIFICANT CHANGE UP (ref 0.2–1.2)
BUN SERPL-MCNC: 11 MG/DL — SIGNIFICANT CHANGE UP (ref 7–23)
CALCIUM SERPL-MCNC: 9.2 MG/DL — SIGNIFICANT CHANGE UP (ref 8.4–10.5)
CHLORIDE SERPL-SCNC: 102 MMOL/L — SIGNIFICANT CHANGE UP (ref 98–107)
CO2 SERPL-SCNC: 23 MMOL/L — SIGNIFICANT CHANGE UP (ref 22–31)
CREAT SERPL-MCNC: 0.85 MG/DL — SIGNIFICANT CHANGE UP (ref 0.5–1.3)
E CLOAC COMP DNA BLD POS QL NAA+PROBE: SIGNIFICANT CHANGE UP
E COLI DNA BLD POS QL NAA+NON-PROBE: SIGNIFICANT CHANGE UP
ENTEROCOC DNA BLD POS QL NAA+NON-PROBE: SIGNIFICANT CHANGE UP
ENTEROCOC DNA BLD POS QL NAA+NON-PROBE: SIGNIFICANT CHANGE UP
GLUCOSE SERPL-MCNC: 99 MG/DL — SIGNIFICANT CHANGE UP (ref 70–99)
GP B STREP DNA BLD POS QL NAA+NON-PROBE: SIGNIFICANT CHANGE UP
HAEM INFLU DNA BLD POS QL NAA+NON-PROBE: SIGNIFICANT CHANGE UP
HCT VFR BLD CALC: 37.6 % — LOW (ref 39–50)
HGB BLD-MCNC: 12 G/DL — LOW (ref 13–17)
K OXYTOCA DNA BLD POS QL NAA+NON-PROBE: SIGNIFICANT CHANGE UP
L MONOCYTOG DNA BLD POS QL NAA+NON-PROBE: SIGNIFICANT CHANGE UP
LKM AB SER-ACNC: < 20.1 UNITS — SIGNIFICANT CHANGE UP (ref 0–20)
MAGNESIUM SERPL-MCNC: 2.1 MG/DL — SIGNIFICANT CHANGE UP (ref 1.6–2.6)
MCHC RBC-ENTMCNC: 26.7 PG — LOW (ref 27–34)
MCHC RBC-ENTMCNC: 31.9 % — LOW (ref 32–36)
MCV RBC AUTO: 83.6 FL — SIGNIFICANT CHANGE UP (ref 80–100)
METHOD TYPE: SIGNIFICANT CHANGE UP
MRSA SPEC QL CULT: SIGNIFICANT CHANGE UP
MSSA DNA SPEC QL NAA+PROBE: SIGNIFICANT CHANGE UP
N MEN ISLT CULT: SIGNIFICANT CHANGE UP
NRBC # FLD: 0 K/UL — LOW (ref 25–125)
ORGANISM # SPEC MICROSCOPIC CNT: SIGNIFICANT CHANGE UP
P AERUGINOSA DNA BLD POS NAA+NON-PROBE: SIGNIFICANT CHANGE UP
PHOSPHATE SERPL-MCNC: 3.6 MG/DL — SIGNIFICANT CHANGE UP (ref 2.5–4.5)
PLATELET # BLD AUTO: 272 K/UL — SIGNIFICANT CHANGE UP (ref 150–400)
PMV BLD: 11.2 FL — SIGNIFICANT CHANGE UP (ref 7–13)
POTASSIUM SERPL-MCNC: 4.5 MMOL/L — SIGNIFICANT CHANGE UP (ref 3.5–5.3)
POTASSIUM SERPL-SCNC: 4.5 MMOL/L — SIGNIFICANT CHANGE UP (ref 3.5–5.3)
PROT SERPL-MCNC: 7.1 G/DL — SIGNIFICANT CHANGE UP (ref 6–8.3)
RBC # BLD: 4.5 M/UL — SIGNIFICANT CHANGE UP (ref 4.2–5.8)
RBC # FLD: 14 % — SIGNIFICANT CHANGE UP (ref 10.3–14.5)
S MARCESCENS DNA BLD POS NAA+NON-PROBE: SIGNIFICANT CHANGE UP
S PNEUM DNA BLD POS QL NAA+NON-PROBE: SIGNIFICANT CHANGE UP
S PYO DNA BLD POS QL NAA+NON-PROBE: SIGNIFICANT CHANGE UP
SODIUM SERPL-SCNC: 138 MMOL/L — SIGNIFICANT CHANGE UP (ref 135–145)
SPECIMEN SOURCE: SIGNIFICANT CHANGE UP
WBC # BLD: 6.71 K/UL — SIGNIFICANT CHANGE UP (ref 3.8–10.5)
WBC # FLD AUTO: 6.71 K/UL — SIGNIFICANT CHANGE UP (ref 3.8–10.5)

## 2019-02-23 PROCEDURE — 99232 SBSQ HOSP IP/OBS MODERATE 35: CPT

## 2019-02-23 PROCEDURE — 99233 SBSQ HOSP IP/OBS HIGH 50: CPT | Mod: GC

## 2019-02-23 RX ORDER — CEFTRIAXONE 500 MG/1
2 INJECTION, POWDER, FOR SOLUTION INTRAMUSCULAR; INTRAVENOUS EVERY 24 HOURS
Qty: 0 | Refills: 0 | Status: DISCONTINUED | OUTPATIENT
Start: 2019-02-24 | End: 2019-02-24

## 2019-02-23 RX ORDER — CEFTRIAXONE 500 MG/1
INJECTION, POWDER, FOR SOLUTION INTRAMUSCULAR; INTRAVENOUS
Qty: 0 | Refills: 0 | Status: DISCONTINUED | OUTPATIENT
Start: 2019-02-23 | End: 2019-02-24

## 2019-02-23 RX ORDER — CEFTRIAXONE 500 MG/1
2 INJECTION, POWDER, FOR SOLUTION INTRAMUSCULAR; INTRAVENOUS ONCE
Qty: 0 | Refills: 0 | Status: COMPLETED | OUTPATIENT
Start: 2019-02-23 | End: 2019-02-23

## 2019-02-23 RX ADMIN — MEROPENEM 100 MILLIGRAM(S): 1 INJECTION INTRAVENOUS at 05:55

## 2019-02-23 RX ADMIN — CEFTRIAXONE 100 GRAM(S): 500 INJECTION, POWDER, FOR SOLUTION INTRAMUSCULAR; INTRAVENOUS at 12:03

## 2019-02-23 NOTE — PROGRESS NOTE ADULT - PROBLEM SELECTOR PLAN 1
2 week history of fever from unclear etiology during visit to [place]. History complicated by Diarrhea and intermittent coughing. Likely from GNR bacteremia 2/2 Pyelo/UTI vs. campylobacter/salmonella infection.y  - repeat BCx (2/20) positive for GNR.  - UCx positive for E. Coli in midrange in setting of long Augmentin. Imaging concerning for pyelo. Patient currently denies any UTI Sx or pyelo Sx.  - GI PCR positive for salmonella and campylobacter.   - s/p 3 day course of Azithro for campylobacter/salmonella  - started on meropenem 1g q8h and polymyxin B 600K q12h.  - on Polymyxin B given his travel history and sensitivity result w/ multiple drug resistance. Extra drug sensitivity test sent by ID. Had neurologic side effect with polymyxin B. discussed w/ Dr. Akins (ID attending), and will try reduced dose of Polymyxin B  - Stool O/P negative. Malaria negative x2, RVP negative, Dengue negative. Quant negative  - Brucella, Chikungunya, CMV, Hep E pending  - CXR clear. CT chest clear w/ no signs of TB. removed airborne precaution.  - RUQ U/S unremarkable   - CT Chest and A/P w/o obvious infectious foci. Heterogeneity in kidney with ?pyelo. Yet patient w/o CVA tenderness or Urinary symptoms.  - ID on board  - LDH and CK normal. RF mildly elevated.   - autoimmune hepatitis panel pending.  - TTE 2 week history of fever from unclear etiology during visit to [facility]. History complicated by Diarrhea and intermittent coughing. Likely from GNR bacteremia 2/2 Pyelo/UTI vs. campylobacter/salmonella infection.  - repeat BCx (2/20) positive for salmonella sp. pansensitive.  - UCx positive for E. Coli in midrange in setting of long Augmentin. Imaging concerning for pyelo. Patient currently denies any UTI Sx or pyelo Sx.  - GI PCR positive for salmonella and campylobacter.   - s/p 3 day course of Azithro for campylobacter/salmonella  - Polymyixin B stopped after BCx confirmed for salmonella sp.  - Meropenem narrowed to CTX 2g qD with sensitivity result.  - Stool O/P negative. Malaria negative x2, RVP negative, Dengue negative. Quant, hep Enegative  - Brucella, Chikungunya, CMV pending  - CXR clear. CT chest clear w/ no signs of TB. removed airborne precaution.  - RUQ U/S unremarkable   - CT Chest and A/P w/o obvious infectious foci. Heterogeneity in kidney with ?pyelo. Yet patient w/o CVA tenderness or Urinary symptoms.  - ID on board  - LDH and CK normal. RF mildly elevated.   - autoimmune hepatitis panel pending.

## 2019-02-23 NOTE — PROGRESS NOTE ADULT - ATTENDING COMMENTS
Patient seen and examined by myself , case discussed  with resident ,agree with the above finding and plan  . Patient reports he feels fine and has no complaints. Denies any nausea, vomiting, diarrhea, abdominal pain, dysuria, urinary frequency, cough, CP, SOB. Blood culture positive for salmonella. Abx changed to Ceftriaxone, Repeat Blood cx negative from 2/22    will monitor for clinical improvement

## 2019-02-23 NOTE — PROGRESS NOTE ADULT - SUBJECTIVE AND OBJECTIVE BOX
Luc Hurtado Rosio  PGY-1  Pager: [phone]    Patient is a 28y old  Male who presents with a chief complaint of persistent Fever (2019 07:10)      SUBJECTIVE / OVERNIGHT EVENTS:    MEDICATIONS  (STANDING):  influenza   Vaccine 0.5 milliLiter(s) IntraMuscular once  meropenem  IVPB 1000 milliGRAM(s) IV Intermittent every 8 hours    MEDICATIONS  (PRN):  acetaminophen   Tablet .. 650 milliGRAM(s) Oral every 6 hours PRN Temp greater or equal to 38C (100.4F), Mild Pain (1 - 3), Moderate Pain (4 - 6)      T(C): 36.9 (19 @ 06:02), Max: 38.4 (19 @ 13:18)  HR: 89 (19 @ 06:02) (89 - 107)  BP: 109/62 (19 @ 06:02) (109/62 - 122/68)  RR: 18 (19 @ 06:02) (18 - 18)  SpO2: 98% (19 @ 06:02) (97% - 98%)  CAPILLARY BLOOD GLUCOSE        I&O's Summary      PHYSICAL EXAM:  GENERAL: NAD, well-developed  HEAD:  Atraumatic, Normocephalic  EYES: EOMI, PERRLA, conjunctiva and sclera clear  NECK: Supple, No JVD  CHEST/LUNG: Clear to auscultation bilaterally; No wheeze  HEART: Regular rate and rhythm; No murmurs, rubs, or gallops  ABDOMEN: Soft, Nontender, Nondistended; Bowel sounds present  EXTREMITIES:  2+ Peripheral Pulses, No clubbing, cyanosis, or edema  PSYCH: AAOx3  NEUROLOGY: non-focal  SKIN: No rashes or lesions    LABS:                        12.2   7.44  )-----------( 282      ( 2019 06:30 )             38.3     -    137  |  101  |  15  ----------------------------<  115<H>  4.2   |  21<L>  |  0.92    Ca    9.0      2019 06:30  Phos  4.3       Mg     2.0         TPro  7.2  /  Alb  3.6  /  TBili  0.5  /  DBili  x   /  AST  79<H>  /  ALT  226<H>  /  AlkPhos  119            Urinalysis Basic - ( 2019 15:09 )    Color: YELLOW / Appearance: CLEAR / S.022 / pH: 5.5  Gluc: NEGATIVE / Ketone: NEGATIVE  / Bili: NEGATIVE / Urobili: NORMAL   Blood: TRACE / Protein: 20 / Nitrite: NEGATIVE   Leuk Esterase: NEGATIVE / RBC: 0-2 / WBC 0-2   Sq Epi: OCC / Non Sq Epi: x / Bacteria: NEGATIVE        RADIOLOGY & ADDITIONAL TESTS:    Imaging Personally Reviewed:    Consultant(s) Notes Reviewed:      Care Discussed with Consultants/Other Providers: Luc Avelar  PGY-1  Pager: [phone]    Patient is a 28y old  Male who presents with a chief complaint of persistent Fever (2019 07:10)      SUBJECTIVE / OVERNIGHT EVENTS:  No acute events overnight.  Patient subjectively doing well w/ diarrhea completely resolved.  Denies fever, chills, or rigors overnight.  Eating well. Ambulating well.     MEDICATIONS  (STANDING):  influenza   Vaccine 0.5 milliLiter(s) IntraMuscular once  meropenem  IVPB 1000 milliGRAM(s) IV Intermittent every 8 hours    MEDICATIONS  (PRN):  acetaminophen   Tablet .. 650 milliGRAM(s) Oral every 6 hours PRN Temp greater or equal to 38C (100.4F), Mild Pain (1 - 3), Moderate Pain (4 - 6)      T(C): 36.9 (19 @ 06:02), Max: 38.4 (19 @ 13:18)  HR: 89 (19 @ 06:02) (89 - 107)  BP: 109/62 (19 @ 06:02) (109/62 - 122/68)  RR: 18 (19 @ 06:02) (18 - 18)  SpO2: 98% (19 @ 06:02) (97% - 98%)  CAPILLARY BLOOD GLUCOSE        I&O's Summary      PHYSICAL EXAM:  Constitutional: NAD.  HEENT: AT/NC, EOMI, MMM, supple neck  Respiratory: CTAB. equal aeration bilaterally. no wheezing, no crackes, no rhonchi. no increase in WOB  Cardiovascular: RRR, no M/R/G. 2+ distal pulses. Cap refill ~ 3 seconds  Gastrointestinal: soft; NT/ND, +BS, no rebounding tenderness or guarding.   Genitourinary: not examined.  Extremities: no cyanosis; no pedal edema, non-tender to palpation, DP and Radial pulses intact.  Neurological: A&Ox 3; epicritic and protopathic sensation intact: CN nerves grossly intact.   MSK/Back: no deformities. Active ROM intact; strength intact, ambulating  Psychiatric: normal mood/affect.     LABS:                        12.2   7.44  )-----------( 282      ( 2019 06:30 )             38.3         137  |  101  |  15  ----------------------------<  115<H>  4.2   |  21<L>  |  0.92    Ca    9.0      2019 06:30  Phos  4.3       Mg     2.0         TPro  7.2  /  Alb  3.6  /  TBili  0.5  /  DBili  x   /  AST  79<H>  /  ALT  226<H>  /  AlkPhos  119            Urinalysis Basic - ( 2019 15:09 )    Color: YELLOW / Appearance: CLEAR / S.022 / pH: 5.5  Gluc: NEGATIVE / Ketone: NEGATIVE  / Bili: NEGATIVE / Urobili: NORMAL   Blood: TRACE / Protein: 20 / Nitrite: NEGATIVE   Leuk Esterase: NEGATIVE / RBC: 0-2 / WBC 0-2   Sq Epi: OCC / Non Sq Epi: x / Bacteria: NEGATIVE        RADIOLOGY & ADDITIONAL TESTS:    Imaging Personally Reviewed: SANDRA    Consultant(s) Notes Reviewed:  ID    Care Discussed with Consultants/Other Providers: SANDRA

## 2019-02-23 NOTE — PROGRESS NOTE ADULT - PROBLEM SELECTOR PLAN 3
Increased mildly from admission: unclear etiology- viral vs drug related vs hepatic disease vs. sepsis 2/2 bacteremia  - will send autoimmune hepatitis panel with morning labs  - RUQ US reviewed; no acute pathology 2/2 salmonella hepatitis.   - RUQ US reviewed; no acute pathology  - c/w ABx as above.

## 2019-02-23 NOTE — PROGRESS NOTE ADULT - ASSESSMENT
27 yo M no PMH recent travel to [place] presenting with febrile illness.  Fever, tachycardia, diarrhea now improving    Malaria smear neg    UCX with CRE E coli     GI PCR with Salmonella, Campylobacter  completed tx for campy  Salmonella bacteremia-  BCX 2/20 with salmonella    Afebrile x 24 hours.  No abdominal tenderness.    Suggest: agree with ceftriaxone 2 gm iv q 24 h                follow repeat blood cultures and trend LFTs to assure continue downtrending.                if blood cultures remain neg x 48 hours and can tolerate po can transition to cipro 500 mg po BID to complete total 14 day course.    Saundra Gutierrez MD  Pager: [phone]  After 5 PM or weekends please call fellow on call or office [phone]

## 2019-02-23 NOTE — PROGRESS NOTE ADULT - PROBLEM SELECTOR PLAN 2
Hx of Acute diarrhea in setting of fever and starting Augmentin. Now resolved.  Seems to have diarrhea after PO intake (meals) likely in the setting of Salmonella and Campylobacter infection.  - s/p Azithromycin 500mg po x 3 days. WDL Hx of Acute diarrhea in setting of fever and starting Augmentin. Now resolved.  Seems to have diarrhea after PO intake (meals) likely in the setting of Salmonella and Campylobacter infection.  - s/p Azithromycin 500mg po x 3 days.  - on CTX as above.

## 2019-02-23 NOTE — PROGRESS NOTE ADULT - ASSESSMENT
Mr. Romero is a 29 y/o male with no PMH who presented to the ED w/ Fever of unknown origin likely from GNR bacteremia 2/2 Pyelo/UTI from MDR E. coli vs. campylobacter/salmonella infection.

## 2019-02-23 NOTE — PROGRESS NOTE ADULT - SUBJECTIVE AND OBJECTIVE BOX
CC: F/U for GNR Bacteremia    feels better.  no fever, chills, diarrhea, HA. tolerating po.    Allergies  No Known Allergies    cefTRIAXone   IVPB        PE:    Vital Signs Last 24 Hrs  T(F): 98.5 (19 @ 06:02), Max: 99.4 (19 @ 13:52)  HR: 89 (19 @ 06:02)  BP: 109/62 (19 @ 06:02)  RR: 18 (19 @ 06:02)  SpO2: 98% (19 @ 06:02) (97% - 98%)    Gen: AOx3, NAD, non-toxic, pleasant  CV: S1+S2 normal, nontachycardic  Resp: Clear bilat, no resp distress, no crackles/wheezes  Abd: Soft, nontender, +BS  Ext: No LE edema, no wounds    LABS:                                   12.0   6.71  )-----------( 272      ( 2019 06:40 )             37.6     138  |  102  |  11  ----------------------------<  99  4.5   |  23  |  0.85  Ca    9.2      2019 06:40Phos  3.6     Mg     2.1       TPro  7.1  /  Alb  3.4  /  TBili  0.5  /  DBili  x   /  AST  66  /  ALT  210  /  AlkPhos  105      Urinalysis Basic - ( 2019 15:09 )    Color: YELLOW / Appearance: CLEAR / S.022 / pH: 5.5  Gluc: NEGATIVE / Ketone: NEGATIVE  / Bili: NEGATIVE / Urobili: NORMAL   Blood: TRACE / Protein: 20 / Nitrite: NEGATIVE   Leuk Esterase: NEGATIVE / RBC: 0-2 / WBC 0-2   Sq Epi: OCC / Non Sq Epi: x / Bacteria: NEGATIVE    MICROBIOLOGY:    Culture - Blood in AM (19 @ 07:05)    Culture - Blood:   NO ORGANISMS ISOLATED  NO ORGANISMS ISOLATED AT 24 HOURS    Specimen Source: BLOOD VENOUS    Culture - Blood in AM (19 @ 07:05)    Culture - Blood:   NO ORGANISMS ISOLATED  NO ORGANISMS ISOLATED AT 24 HOURS    Specimen Source: BLOOD PERIPHERAL    Culture - Blood (19 @ 16:29)    -  Multidrug (KPC pos) resistant organism: - NOTDETECT ASHLEY    -  Staphylococcus aureus: - NOTDETECT ASHLEY    -  Methicillin resistant Staphylococcus aureus (MRSA): - NOTDETECT ASHLEY    -  Coagulase negative Staphylococcus: - NOTDETECT ASHLEY    -  Enterococcus species: - NOTDETECT ASHLEY    -  Vancomycin resistant Enterococcus sp.: - NOTDETECT ASHLEY    -  Escherichia coli: - NOTDETECT ASHLEY    -  Klebsiella oxytoca: - NOTDETECT ASHLEY    -  Klebsiella pneumoniae: - NOTDETECT ASHLEY    -  Serratia marcescens: - NOTDETECT ASHLEY    -  Haemophilus influenzae: - NOTDETECT ASHLEY    -  Listeria monocytogenes: - NOTDETECT ASHLEY    -  Neisseria meningitidis: - NOTDETECT ASHLEY    -  Pseudomonas aeruginosa: - NOTDETECT ASHLEY    -  Acinetobacter baumanii: - NOTDETECT ASHLEY    -  Enterobacter cloacae complex: - NOTDETECT ASHLEY    -  Streptococcus sp. (Not Grp A, B or S pneumoniae): - NOTDETECT ASHLEY    -  Streptococcus agalactiae (Group B): - NOTDETECT ASHLEY    -  Streptococcus pyogenes (Group A): - NOTDETECT ASHLEY    -  Streptococcus pneumoniae: - NOTDETECT ASHLEY    -  Candida albicans: - NOTDETECT ASHLEY    -  Candida glabrata: - NOTDETECT ASHLEY    -  Reanna krusei: - NOTDETECT ASHLEY    -  Candida parapsilosis: - NOTDETECT ASHLEY    -  Candida tropicalis: - NOTDETECT ASHLEY    Culture - Blood:   ***Blood Panel PCR results on this specimen are available  approximately 3 hours after the Gram stain result***  Gram stain, PCR, and/or culture results may not always  correspond due to difference in methodologies  ------------------------------------------------------------  This PCR assay was performed using [employer].  The  following targets are tested for:  Enterococcus, vancomycin  resistant enterococci, Listeria monocytogenes,  coagulase  negative staphylococci, S. aureus, methicillin resistant S.  aureus, Streptococcus agalactiae (Group B), S. pneumoniae,  S. pyogenes (Group A), Acinetobacter baumannii, Enterobacter  cloacae, E. coli, Klebsiella oxytoca, K. pneumoniae, Proteus  sp., Serratia marcescens, Haemophilus influenzae, Neisseria  meningitidis, Pseudomonas aeruginosa, Candida albicans, C.  glabrata, C. krusei, C. parapsilosis, C. tropicalis and the  KPC resistance gene.  **NOTE: Due to technical problems, Proteus sp. will NOT be  reported as part of the BCID paneluntil further notice.    Culture - Blood:   SEE PREVIOUS CULTURE: COLLECTED 19  RECEIVED 19  B#[id number] FOR ASHLEY  NOTIFICATION RANDY 19 ON B#[id number]    Specimen Source: BLOOD VENOUS    Organism: BLOOD CULTURE PCR  ***Blood Panel PCR results on this specimen are available  approximately 3 hours after the Gram stain result***  Gram stain, PCR, and/or culture results may not always  correspond due to difference in methodologies  ------------------------------------------------------------  This PCR assay was performed using [employer].  The  following targets are tested for:  Enterococcus, vancomycin  resistant enterococci, Listeria monocytogenes,  coagulase  negative staphylococci, S. aureus, methicillin resistant S.  aureus, Streptococcus agalactiae (Group B), S. pneumoniae,  S. pyogenes (Group A), Acinetobacter baumannii, Enterobacter  cloacae, E. coli, Klebsiella oxytoca, K. pneumoniae, Proteus  sp., Serratia marcescens, Haemophilus influenzae, Neisseria  meningitidis, Pseudomonas aeruginosa, Candida albicans, C.  glabrata, C. krusei, C. parapsilosis, C. tropicalis and the  KPC resistance gene.  **NOTE: Due to technical problems, Proteus sp. will NOT be  reported as part of the BCID panel until further notice.    Organism: Salmonella sp.    Gram Stain Blood:   GNR^Gram Neg Rods  AFTER: 18 HOURS INCUBATION  BOTTLE: AEROBIC BOTTLE    Gram Stain Blood:   ***** CRITICAL RESULT *****  PERSON CALLED / READ-BACK: NETTIE LANDRUM RN / ERICKA  DATE / TIME CALLED: 19 1623  CALLED BY: MICHELLE WHITLEY                *******************************                * This is an appended result. *  *******************************  A prior result that was reported as final has been changed.  GNR^Gram Neg Rods  AFTER: 18 HOURS INCUBATION  BOTTLE: AEROBIC   ANAEROBIC BOTTLES    Organism Identification: BLOOD CULTURE PCR  Salmonella sp.    Method Type: PCR        FECES  19 Salmonella, Campy    URINE MIDSTREAM  02-15-19 --  --  Escherichia coli CRE    (otherwise reviewed)    RADIOLOGY:     USG:    IMPRESSION:     [place] kidney is not well appreciated on this examination compared to   CT abdomen and pelvis 2/15/2019. No hydronephrosis.    Liver within normal limits.

## 2019-02-24 ENCOUNTER — TRANSCRIPTION ENCOUNTER (OUTPATIENT)
Age: 29
End: 2019-02-24

## 2019-02-24 VITALS
SYSTOLIC BLOOD PRESSURE: 126 MMHG | OXYGEN SATURATION: 97 % | DIASTOLIC BLOOD PRESSURE: 78 MMHG | TEMPERATURE: 99 F | HEART RATE: 93 BPM | RESPIRATION RATE: 18 BRPM

## 2019-02-24 LAB
ALBUMIN SERPL ELPH-MCNC: 3.5 G/DL — SIGNIFICANT CHANGE UP (ref 3.3–5)
ALP SERPL-CCNC: 108 U/L — SIGNIFICANT CHANGE UP (ref 40–120)
ALT FLD-CCNC: 206 U/L — HIGH (ref 4–41)
ANION GAP SERPL CALC-SCNC: 13 MMO/L — SIGNIFICANT CHANGE UP (ref 7–14)
AST SERPL-CCNC: 67 U/L — HIGH (ref 4–40)
BILIRUB SERPL-MCNC: 0.5 MG/DL — SIGNIFICANT CHANGE UP (ref 0.2–1.2)
BUN SERPL-MCNC: 13 MG/DL — SIGNIFICANT CHANGE UP (ref 7–23)
CALCIUM SERPL-MCNC: 9.4 MG/DL — SIGNIFICANT CHANGE UP (ref 8.4–10.5)
CHLORIDE SERPL-SCNC: 101 MMOL/L — SIGNIFICANT CHANGE UP (ref 98–107)
CO2 SERPL-SCNC: 24 MMOL/L — SIGNIFICANT CHANGE UP (ref 22–31)
CREAT SERPL-MCNC: 0.84 MG/DL — SIGNIFICANT CHANGE UP (ref 0.5–1.3)
GLUCOSE SERPL-MCNC: 94 MG/DL — SIGNIFICANT CHANGE UP (ref 70–99)
HCT VFR BLD CALC: 37.5 % — LOW (ref 39–50)
HGB BLD-MCNC: 11.7 G/DL — LOW (ref 13–17)
MAGNESIUM SERPL-MCNC: 2.2 MG/DL — SIGNIFICANT CHANGE UP (ref 1.6–2.6)
MCHC RBC-ENTMCNC: 26.4 PG — LOW (ref 27–34)
MCHC RBC-ENTMCNC: 31.2 % — LOW (ref 32–36)
MCV RBC AUTO: 84.7 FL — SIGNIFICANT CHANGE UP (ref 80–100)
NRBC # FLD: 0 K/UL — LOW (ref 25–125)
PHOSPHATE SERPL-MCNC: 4.2 MG/DL — SIGNIFICANT CHANGE UP (ref 2.5–4.5)
PLATELET # BLD AUTO: 299 K/UL — SIGNIFICANT CHANGE UP (ref 150–400)
PMV BLD: 11.1 FL — SIGNIFICANT CHANGE UP (ref 7–13)
POTASSIUM SERPL-MCNC: 4.4 MMOL/L — SIGNIFICANT CHANGE UP (ref 3.5–5.3)
POTASSIUM SERPL-SCNC: 4.4 MMOL/L — SIGNIFICANT CHANGE UP (ref 3.5–5.3)
PROT SERPL-MCNC: 7.4 G/DL — SIGNIFICANT CHANGE UP (ref 6–8.3)
RBC # BLD: 4.43 M/UL — SIGNIFICANT CHANGE UP (ref 4.2–5.8)
RBC # FLD: 13.8 % — SIGNIFICANT CHANGE UP (ref 10.3–14.5)
SODIUM SERPL-SCNC: 138 MMOL/L — SIGNIFICANT CHANGE UP (ref 135–145)
WBC # BLD: 6.52 K/UL — SIGNIFICANT CHANGE UP (ref 3.8–10.5)
WBC # FLD AUTO: 6.52 K/UL — SIGNIFICANT CHANGE UP (ref 3.8–10.5)

## 2019-02-24 PROCEDURE — 99232 SBSQ HOSP IP/OBS MODERATE 35: CPT

## 2019-02-24 PROCEDURE — 99239 HOSP IP/OBS DSCHRG MGMT >30: CPT

## 2019-02-24 RX ADMIN — CEFTRIAXONE 100 GRAM(S): 500 INJECTION, POWDER, FOR SOLUTION INTRAMUSCULAR; INTRAVENOUS at 12:49

## 2019-02-24 NOTE — PROGRESS NOTE ADULT - SUBJECTIVE AND OBJECTIVE BOX
Luc Hurtado Rosio  PGY-1  Pager: [phone]    Patient is a 28y old  Male who presents with a chief complaint of persistent Fever (23 Feb 2019 13:37)      SUBJECTIVE / OVERNIGHT EVENTS:    MEDICATIONS  (STANDING):  cefTRIAXone   IVPB      cefTRIAXone   IVPB 2 Gram(s) IV Intermittent every 24 hours  influenza   Vaccine 0.5 milliLiter(s) IntraMuscular once    MEDICATIONS  (PRN):  acetaminophen   Tablet .. 650 milliGRAM(s) Oral every 6 hours PRN Temp greater or equal to 38C (100.4F), Mild Pain (1 - 3), Moderate Pain (4 - 6)      T(C): 36.7 (02-24-19 @ 06:02), Max: 37.7 (02-23-19 @ 23:16)  HR: 76 (02-24-19 @ 06:02) (76 - 99)  BP: 107/59 (02-24-19 @ 06:02) (107/59 - 114/58)  RR: 18 (02-24-19 @ 06:02) (18 - 18)  SpO2: 100% (02-24-19 @ 06:02) (98% - 100%)  CAPILLARY BLOOD GLUCOSE        I&O's Summary      PHYSICAL EXAM:  GENERAL: NAD, well-developed  HEAD:  Atraumatic, Normocephalic  EYES: EOMI, PERRLA, conjunctiva and sclera clear  NECK: Supple, No JVD  CHEST/LUNG: Clear to auscultation bilaterally; No wheeze  HEART: Regular rate and rhythm; No murmurs, rubs, or gallops  ABDOMEN: Soft, Nontender, Nondistended; Bowel sounds present  EXTREMITIES:  2+ Peripheral Pulses, No clubbing, cyanosis, or edema  PSYCH: AAOx3  NEUROLOGY: non-focal  SKIN: No rashes or lesions    LABS:                        11.7   6.52  )-----------( 299      ( 24 Feb 2019 06:20 )             37.5     02-24    138  |  101  |  13  ----------------------------<  94  4.4   |  24  |  0.84    Ca    9.4      24 Feb 2019 06:20  Phos  4.2     02-24  Mg     2.2     02-24    TPro  7.4  /  Alb  3.5  /  TBili  0.5  /  DBili  x   /  AST  67<H>  /  ALT  206<H>  /  AlkPhos  108  02-24              RADIOLOGY & ADDITIONAL TESTS:    Imaging Personally Reviewed:    Consultant(s) Notes Reviewed:      Care Discussed with Consultants/Other Providers: Luc Avelar  PGY-1  Pager: [phone]    Patient is a 28y old  Male who presents with a chief complaint of persistent Fever (23 Feb 2019 13:37)      SUBJECTIVE / OVERNIGHT EVENTS:  No acute events overnight.  Patient has been afebrile and subjectively doing great.  No acute complaints. ROS grossly negative.    MEDICATIONS  (STANDING):  cefTRIAXone   IVPB      cefTRIAXone   IVPB 2 Gram(s) IV Intermittent every 24 hours  influenza   Vaccine 0.5 milliLiter(s) IntraMuscular once    MEDICATIONS  (PRN):  acetaminophen   Tablet .. 650 milliGRAM(s) Oral every 6 hours PRN Temp greater or equal to 38C (100.4F), Mild Pain (1 - 3), Moderate Pain (4 - 6)      T(C): 36.7 (02-24-19 @ 06:02), Max: 37.7 (02-23-19 @ 23:16)  HR: 76 (02-24-19 @ 06:02) (76 - 99)  BP: 107/59 (02-24-19 @ 06:02) (107/59 - 114/58)  RR: 18 (02-24-19 @ 06:02) (18 - 18)  SpO2: 100% (02-24-19 @ 06:02) (98% - 100%)  CAPILLARY BLOOD GLUCOSE        I&O's Summary      PHYSICAL EXAM:  Constitutional: NAD.  HEENT: AT/NC, EOMI, MMM, supple neck  Respiratory: CTAB. equal aeration bilaterally. no wheezing, no crackes, no rhonchi. no increase in WOB  Cardiovascular: RRR, no M/R/G. 2+ distal pulses. Cap refill ~ 3 seconds  Gastrointestinal: soft; NT/ND, +BS, no rebounding tenderness or guarding.   Genitourinary: not examined.  Extremities: no cyanosis; no pedal edema, non-tender to palpation, DP and Radial pulses intact.  Neurological: A&Ox 3; epicritic and protopathic sensation intact: CN nerves grossly intact.   MSK/Back: no deformities. Active ROM intact; strength intact, ambulating  Psychiatric: normal mood/affect.     LABS:                        11.7   6.52  )-----------( 299      ( 24 Feb 2019 06:20 )             37.5     02-24    138  |  101  |  13  ----------------------------<  94  4.4   |  24  |  0.84    Ca    9.4      24 Feb 2019 06:20  Phos  4.2     02-24  Mg     2.2     02-24    TPro  7.4  /  Alb  3.5  /  TBili  0.5  /  DBili  x   /  AST  67<H>  /  ALT  206<H>  /  AlkPhos  108  02-24              RADIOLOGY & ADDITIONAL TESTS:    Imaging Personally Reviewed: SANDRA    Consultant(s) Notes Reviewed:  ID    Care Discussed with Consultants/Other Providers: ID

## 2019-02-24 NOTE — DISCHARGE NOTE ADULT - PATIENT PORTAL LINK FT
You can access the [facility] Patient Portal, offered by [facility], by registering with the following website: [email or web address]

## 2019-02-24 NOTE — PROGRESS NOTE ADULT - PROBLEM SELECTOR PLAN 4
- DVT ppx: improve score low. Ambulating well. No need for prophylaxis  - Diet: regular diet - DVT ppx: improve score low. Ambulating well. No need for prophylaxis  - Diet: regular diet  - Dispo today with Cipro and ID followup.

## 2019-02-24 NOTE — PROGRESS NOTE ADULT - ASSESSMENT
Mr. Romero is a 27 y/o male with no PMH who presented to the ED w/ Fever of unknown origin likely from GNR bacteremia 2/2 Pyelo/UTI from MDR E. coli vs. campylobacter/salmonella infection. Mr. Romero is a 29 y/o male with no PMH who presented to the ED w/ Fever of unknown origin from salmonella bacteremia.

## 2019-02-24 NOTE — PROGRESS NOTE ADULT - PROBLEM SELECTOR PLAN 1
2 week history of fever from unclear etiology during visit to [facility]. History complicated by Diarrhea and intermittent coughing. Likely from GNR bacteremia 2/2 Pyelo/UTI vs. campylobacter/salmonella infection.  - repeat BCx (2/20) positive for salmonella sp. pansensitive.  - UCx positive for E. Coli in midrange in setting of long Augmentin. Imaging concerning for pyelo. Patient currently denies any UTI Sx or pyelo Sx.  - GI PCR positive for salmonella and campylobacter.   - s/p 3 day course of Azithro for campylobacter/salmonella  - Polymyixin B stopped after BCx confirmed for salmonella sp.  - Meropenem narrowed to CTX 2g qD with sensitivity result.  - Stool O/P negative. Malaria negative x2, RVP negative, Dengue negative. Quant, hep Enegative  - Brucella, Chikungunya, CMV pending  - CXR clear. CT chest clear w/ no signs of TB. removed airborne precaution.  - RUQ U/S unremarkable   - CT Chest and A/P w/o obvious infectious foci. Heterogeneity in kidney with ?pyelo. Yet patient w/o CVA tenderness or Urinary symptoms.  - ID on board  - LDH and CK normal. RF mildly elevated.   - autoimmune hepatitis panel pending. 2 week history of fever from unclear etiology during visit to [facility]. History complicated by Diarrhea and intermittent coughing. Salmonella bacteremia  - repeat BCx (2/20) positive for salmonella sp. pansensitive.  - UCx positive for E. Coli in midrange in setting of long Augmentin. Imaging concerning for pyelo. Patient currently denies any UTI Sx or pyelo Sx.  - GI PCR positive for salmonella and campylobacter.   - s/p 3 day course of Azithro for campylobacter/salmonella  - Polymyixin B stopped after BCx confirmed for salmonella sp.  - Meropenem narrowed to CTX 2g qD with sensitivity result.  - Stool O/P negative. Malaria negative x2, RVP negative, Dengue negative. Quant, hep Enegative  - Brucella, Chikungunya, CMV pending  - CXR clear. CT chest clear w/ no signs of TB. removed airborne precaution.  - RUQ U/S unremarkable   - CT Chest and A/P w/o obvious infectious foci. Heterogeneity in kidney with ?pyelo. Yet patient w/o CVA tenderness or Urinary symptoms.  - ID on board  - LDH and CK normal. RF mildly elevated.   - will go home with Cipro per ID rec.

## 2019-02-24 NOTE — DISCHARGE NOTE ADULT - MEDICATION SUMMARY - MEDICATIONS TO TAKE
I will START or STAY ON the medications listed below when I get home from the hospital:    Cipro 500 mg oral tablet  -- 1 tab(s) by mouth 2 times a day for next 12 days starting on 2/25  -- Avoid prolonged or excessive exposure to direct and/or artificial sunlight while taking this medication.  Check with your doctor before becoming pregnant.  Do not take dairy products, antacids, or iron preparations within one hour of this medication.  Finish all this medication unless otherwise directed by prescriber.  Medication should be taken with plenty of water.    -- Indication: For Salmonella bacteremia

## 2019-02-24 NOTE — PROGRESS NOTE ADULT - REASON FOR ADMISSION
persistent Fever

## 2019-02-24 NOTE — PROGRESS NOTE ADULT - PROBLEM SELECTOR PLAN 2
Hx of Acute diarrhea in setting of fever and starting Augmentin. Now resolved.  Seems to have diarrhea after PO intake (meals) likely in the setting of Salmonella and Campylobacter infection.  - s/p Azithromycin 500mg po x 3 days.  - on CTX as above.

## 2019-02-24 NOTE — PROGRESS NOTE ADULT - NSHPATTENDINGPLANDISCUSS_GEN_ALL_CORE
attneding
Patient ,and house staff
Patient, HS3, Family
Patient ,and house staff
Patient, HS3
Patient, Family, HS3
Patient, HS3, SW/CM

## 2019-02-24 NOTE — DISCHARGE NOTE ADULT - CARE PROVIDER_API CALL
Mj Akins)  Infectious Disease; Internal Medicine  [address] [place] [address]  Phone: [phone]  Fax: [phone]  Follow Up Time:

## 2019-02-24 NOTE — PROGRESS NOTE ADULT - SUBJECTIVE AND OBJECTIVE BOX
CC: F/U for GNR Bacteremia    Feels back to baseline.  no fever, chills, abd pain, diarrhea, HA.    Allergies  No Known Allergies    cefTRIAXone   IVPB        PE:    Vital Signs Last 24 Hrs  T(F): 98 (02-24-19 @ 06:02), Max: 99.8 (02-23-19 @ 23:16)  HR: 76 (02-24-19 @ 06:02)  BP: 107/59 (02-24-19 @ 06:02)  RR: 18 (02-24-19 @ 06:02)  SpO2: 100% (02-24-19 @ 06:02) (98% - 100%)    Gen: AOx3, NAD, non-toxic, pleasant  CV: S1+S2 normal, nontachycardic  Resp: Clear bilat, no resp distress, no crackles/wheezes  Abd: Soft, nontender, +BS  Ext: No LE edema, no wounds    LABS:                                   12.0                         11.7   6.52  )-----------( 299      ( 24 Feb 2019 06:20 )             37.5 02-24    138  |  101  |  13  ----------------------------<  94  4.4   |  24  |  0.84  Ca    9.4      24 Feb 2019 06:20Phos  4.2     02-24Mg     2.2     02-24  TPro  7.4  /  Alb  3.5  /  TBili  0.5  /  DBili  x   /  AST  67  /  ALT  206  /  AlkPhos  108  02-24        MICROBIOLOGY:      Culture - Blood in AM (02.22.19 @ 07:05)    Culture - Blood:   NO ORGANISMS ISOLATED  NO ORGANISMS ISOLATED AT 48 HRS.    Specimen Source: BLOOD VENOUS    Culture - Blood in AM (02.22.19 @ 07:05)    Culture - Blood:   NO ORGANISMS ISOLATED  NO ORGANISMS ISOLATED AT 48 HRS.    Specimen Source: BLOOD PERIPHERAL        Culture - Blood (02.20.19 @ 16:29)    -  Multidrug (KPC pos) resistant organism: - NOTDETECT ASHLEY    -  Staphylococcus aureus: - NOTDETECT ASHLEY    -  Methicillin resistant Staphylococcus aureus (MRSA): - NOTDETECT ASHLEY    -  Coagulase negative Staphylococcus: - NOTDETECT ASHLEY    -  Enterococcus species: - NOTDETECT ASHLEY    -  Vancomycin resistant Enterococcus sp.: - NOTDETECT ASHLEY    -  Escherichia coli: - NOTDETECT ASHLEY    -  Klebsiella oxytoca: - NOTDETECT ASHLEY    -  Klebsiella pneumoniae: - NOTDETECT ASHLEY    -  Serratia marcescens: - NOTDETECT ASHLEY    -  Haemophilus influenzae: - NOTDETECT ASHLEY    -  Listeria monocytogenes: - NOTDETECT ASHLEY    -  Neisseria meningitidis: - NOTDETECT ASHLEY    -  Pseudomonas aeruginosa: - NOTDETECT ASHLEY    -  Acinetobacter baumanii: - NOTDETECT ASHLEY    -  Enterobacter cloacae complex: - NOTDETECT ASHLEY    -  Streptococcus sp. (Not Grp A, B or S pneumoniae): - NOTDETECT ASHLEY    -  Streptococcus agalactiae (Group B): - NOTDETECT ASHLEY    -  Streptococcus pyogenes (Group A): - NOTDETECT ASHLEY    -  Streptococcus pneumoniae: - NOTDETECT ASHLEY    -  Candida albicans: - NOTDETECT ASHLEY    -  Candida glabrata: - NOTDETECT ASHLEY    -  Reanna krusei: - NOTDETECT ASHLEY    -  Candida parapsilosis: - NOTDETECT ASHLEY    -  Candida tropicalis: - NOTDETECT ASHLEY    Culture - Blood:   ***Blood Panel PCR results on this specimen are available  approximately 3 hours after the Gram stain result***  Gram stain, PCR, and/or culture results may not always  correspond due to difference in methodologies  ------------------------------------------------------------  This PCR assay was performed using [employer].  The  following targets are tested for:  Enterococcus, vancomycin  resistant enterococci, Listeria monocytogenes,  coagulase  negative staphylococci, S. aureus, methicillin resistant S.  aureus, Streptococcus agalactiae (Group B), S. pneumoniae,  S. pyogenes (Group A), Acinetobacter baumannii, Enterobacter  cloacae, E. coli, Klebsiella oxytoca, K. pneumoniae, Proteus  sp., Serratia marcescens, Haemophilus influenzae, Neisseria  meningitidis, Pseudomonas aeruginosa, Candida albicans, C.  glabrata, C. krusei, C. parapsilosis, C. tropicalis and the  KPC resistance gene.  **NOTE: Due to technical problems, Proteus sp. will NOT be  reported as part of the BCID paneluntil further notice.    Culture - Blood:   SEE PREVIOUS CULTURE: COLLECTED 2/20/19  RECEIVED 2/20/19  B#[id number] FOR ASHLEY  NOTIFICATION RANDY 2/22/19 ON B#[id number]    Specimen Source: BLOOD VENOUS    Organism: BLOOD CULTURE PCR  ***Blood Panel PCR results on this specimen are available  approximately 3 hours after the Gram stain result***  Gram stain, PCR, and/or culture results may not always  correspond due to difference in methodologies  ------------------------------------------------------------  This PCR assay was performed using [employer].  The  following targets are tested for:  Enterococcus, vancomycin  resistant enterococci, Listeria monocytogenes,  coagulase  negative staphylococci, S. aureus, methicillin resistant S.  aureus, Streptococcus agalactiae (Group B), S. pneumoniae,  S. pyogenes (Group A), Acinetobacter baumannii, Enterobacter  cloacae, E. coli, Klebsiella oxytoca, K. pneumoniae, Proteus  sp., Serratia marcescens, Haemophilus influenzae, Neisseria  meningitidis, Pseudomonas aeruginosa, Candida albicans, C.  glabrata, C. krusei, C. parapsilosis, C. tropicalis and the  KPC resistance gene.  **NOTE: Due to technical problems, Proteus sp. will NOT be  reported as part of the BCID panel until further notice.    Organism: Salmonella sp.    Gram Stain Blood:   GNR^Gram Neg Rods  AFTER: 18 HOURS INCUBATION  BOTTLE: AEROBIC BOTTLE    Gram Stain Blood:   ***** CRITICAL RESULT *****  PERSON CALLED / READ-BACK: NETTIE LANDRUM RN / Y  DATE / TIME CALLED: 02/21/19 0218  CALLED BY: MICHELLE WHITLEY                *******************************                * This is an appended result. *  *******************************  A prior result that was reported as final has been changed.  GNR^Gram Neg Rods  AFTER: 18 HOURS INCUBATION  BOTTLE: AEROBIC   ANAEROBIC BOTTLES    Organism Identification: BLOOD CULTURE PCR  Salmonella sp.    Method Type: PCR        FECES  02-16-19 Salmonella, Campy    URINE MIDSTREAM  02-15-19 --  --  Escherichia coli CRE    (otherwise reviewed)    RADIOLOGY:    2/16 USG:    IMPRESSION:     Horseshoe kidney is not well appreciated on this examination compared to   CT abdomen and pelvis 2/15/2019. No hydronephrosis.    Liver within normal limits.

## 2019-02-24 NOTE — DISCHARGE NOTE ADULT - PLAN OF CARE
Please continue to take antibiotics as prescribed and follow up with your primary care doctor and infectious disease doctor. You came in with prolonged fever and found to have salmonella infection in your stool and blood. You responded well to antibiotics and now became afebrile. Continue to take antibiotics and complete the course to prevent recurrence. Follow up with your primary care doctor within 2 weeks from discharge and infectious disease doctor (Dr. Akins).

## 2019-02-24 NOTE — PROGRESS NOTE ADULT - PROBLEM SELECTOR PLAN 3
2/2 salmonella hepatitis.   - RUQ US reviewed; no acute pathology  - c/w ABx as above. 2/2 salmonella hepatitis. Downtrending.  - RUQ US reviewed; no acute pathology  - c/w ABx as above.

## 2019-02-24 NOTE — DISCHARGE NOTE ADULT - CARE PLAN
Principal Discharge DX:	Salmonella infection  Goal:	Please continue to take antibiotics as prescribed and follow up with your primary care doctor and infectious disease doctor.  Assessment and plan of treatment:	You came in with prolonged fever and found to have salmonella infection in your stool and blood. You responded well to antibiotics and now became afebrile. Continue to take antibiotics and complete the course to prevent recurrence. Follow up with your primary care doctor within 2 weeks from discharge and infectious disease doctor (Dr. Akins).

## 2019-02-24 NOTE — PROGRESS NOTE ADULT - PROVIDER SPECIALTY LIST ADULT
Infectious Disease
Internal Medicine
Infectious Disease

## 2019-02-24 NOTE — PROGRESS NOTE ADULT - ATTENDING COMMENTS
Patient seen and examined by myself , case discussed  with resident ,agree with the above finding and plan  . Patient reports he feels fine and has no complaints. Denies any nausea, vomiting, diarrhea, abdominal pain, dysuria, urinary frequency, cough, CP, SOB. Blood culture positive for salmonella.   Abx changed to Ceftriaxone, Repeat Blood cx negative from 2/22    ID f/u appreciated, can be discharged, with PO cipro to complete a 14 days course of ABX , pt to f/u with ID as outpt   will DC home today   Patient hemodynamically stable for discharge home  Time spent in discharge process is 45 min

## 2019-02-24 NOTE — PROGRESS NOTE ADULT - ASSESSMENT
27 yo M no PMH recent travel to [place] presenting with febrile illness.  Fever, tachycardia, diarrhea now improving    Malaria smear neg    UCX with CRE E coli     GI PCR with Salmonella, Campylobacter  completed tx for campy  Salmonella bacteremia-  BCX 2/20 with salmonella. sensitive to all.  repeat blood culture 2/22 neg x 48 hours    Afebrile x 48 hours.  No abdominal tenderness.    LFT's continue to trend down.    Suggest:  can transition to cipro 500 mg po BID to complete total 14 day course.  (10 days)                 if d/c home please have patient f/u in ID clinic in 2 weeks.    Saundra Gutierrez MD  Pager: [phone]  After 5 PM or weekends please call fellow on call or office [phone]

## 2019-02-25 LAB
ANA TITR SER: NEGATIVE — SIGNIFICANT CHANGE UP
C-ANCA SER-ACNC: NEGATIVE — SIGNIFICANT CHANGE UP
P-ANCA SER-ACNC: NEGATIVE — SIGNIFICANT CHANGE UP

## 2019-02-25 RX ORDER — MOXIFLOXACIN HYDROCHLORIDE TABLETS, 400 MG 400 MG/1
1 TABLET, FILM COATED ORAL
Qty: 24 | Refills: 0 | OUTPATIENT
Start: 2019-02-25 | End: 2019-03-08

## 2019-02-26 LAB
CHIKUNGUNYA AB IGG IGM W/REFLEX RESULT: SIGNIFICANT CHANGE UP
CHIKV AB TITR SER: SIGNIFICANT CHANGE UP
MITOCHONDRIA AB SER-ACNC: SIGNIFICANT CHANGE UP
SMOOTH MUSCLE AB SER-ACNC: SIGNIFICANT CHANGE UP

## 2019-02-27 LAB
BACTERIA BLD CULT: SIGNIFICANT CHANGE UP
BACTERIA BLD CULT: SIGNIFICANT CHANGE UP

## 2019-03-01 PROBLEM — Z00.00 ENCOUNTER FOR PREVENTIVE HEALTH EXAMINATION: Status: ACTIVE | Noted: 2019-03-01

## 2019-03-01 LAB
IGG1 SER-MCNC: 866 MG/DL — HIGH (ref 248–810)
IGG2 SER-MCNC: 309 MG/DL — SIGNIFICANT CHANGE UP (ref 130–555)
IGG3 SER-MCNC: 133 MG/DL — HIGH (ref 15–102)
IGG4 SER-MCNC: 55 MG/DL — SIGNIFICANT CHANGE UP (ref 2–96)

## 2019-03-07 ENCOUNTER — OUTPATIENT (OUTPATIENT)
Dept: OUTPATIENT SERVICES | Facility: HOSPITAL | Age: 29
LOS: 1 days | End: 2019-03-07
Payer: COMMERCIAL

## 2019-03-07 ENCOUNTER — APPOINTMENT (OUTPATIENT)
Dept: INFECTIOUS DISEASE | Facility: CLINIC | Age: 29
End: 2019-03-07
Payer: MEDICAID

## 2019-03-07 VITALS
DIASTOLIC BLOOD PRESSURE: 96 MMHG | BODY MASS INDEX: 26.74 KG/M2 | HEART RATE: 116 BPM | SYSTOLIC BLOOD PRESSURE: 150 MMHG | WEIGHT: 191 LBS | HEIGHT: 71 IN | TEMPERATURE: 97.5 F | OXYGEN SATURATION: 97 %

## 2019-03-07 DIAGNOSIS — R78.81 BACTEREMIA: ICD-10-CM

## 2019-03-07 DIAGNOSIS — B97.89 OTHER VIRAL AGENTS AS THE CAUSE OF DISEASES CLASSIFIED ELSEWHERE: ICD-10-CM

## 2019-03-07 DIAGNOSIS — A02.9 SALMONELLA INFECTION, UNSPECIFIED: ICD-10-CM

## 2019-03-07 PROCEDURE — G0463: CPT

## 2019-03-07 PROCEDURE — 99213 OFFICE O/P EST LOW 20 MIN: CPT

## 2019-03-08 LAB
ALBUMIN SERPL ELPH-MCNC: 4.2 G/DL
ALP BLD-CCNC: 109 U/L
ALT SERPL-CCNC: 100 U/L
ANION GAP SERPL CALC-SCNC: 17 MMOL/L
AST SERPL-CCNC: 56 U/L
BASOPHILS # BLD AUTO: 0.06 K/UL
BASOPHILS NFR BLD AUTO: 0.7 %
BILIRUB SERPL-MCNC: 0.2 MG/DL
BUN SERPL-MCNC: 14 MG/DL
CALCIUM SERPL-MCNC: 10.1 MG/DL
CHLORIDE SERPL-SCNC: 108 MMOL/L
CO2 SERPL-SCNC: 17 MMOL/L
CREAT SERPL-MCNC: 0.9 MG/DL
EOSINOPHIL # BLD AUTO: 0.46 K/UL
EOSINOPHIL NFR BLD AUTO: 5.1 %
GLUCOSE SERPL-MCNC: 96 MG/DL
HCT VFR BLD CALC: 41.1 %
HGB BLD-MCNC: 12.4 G/DL
IMM GRANULOCYTES NFR BLD AUTO: 0.4 %
LYMPHOCYTES # BLD AUTO: 2.67 K/UL
LYMPHOCYTES NFR BLD AUTO: 29.6 %
MAN DIFF?: NORMAL
MCHC RBC-ENTMCNC: 26.4 PG
MCHC RBC-ENTMCNC: 30.2 GM/DL
MCV RBC AUTO: 87.6 FL
MONOCYTES # BLD AUTO: 0.58 K/UL
MONOCYTES NFR BLD AUTO: 6.4 %
NEUTROPHILS # BLD AUTO: 5.21 K/UL
NEUTROPHILS NFR BLD AUTO: 57.8 %
PLATELET # BLD AUTO: 307 K/UL
POTASSIUM SERPL-SCNC: 5.2 MMOL/L
PROT SERPL-MCNC: 7.6 G/DL
RBC # BLD: 4.69 M/UL
RBC # FLD: 14.9 %
SODIUM SERPL-SCNC: 142 MMOL/L
WBC # FLD AUTO: 9.02 K/UL

## 2019-03-12 NOTE — PHYSICAL EXAM
[General Appearance - Alert] : alert [General Appearance - Well Nourished] : well nourished [General Appearance - Well-Appearing] : healthy appearing [Sclera] : the sclera and conjunctiva were normal [Outer Ear] : the ears and nose were normal in appearance [Neck Appearance] : the appearance of the neck was normal [] : no respiratory distress [Respiration, Rhythm And Depth] : normal respiratory rhythm and effort [Exaggerated Use Of Accessory Muscles For Inspiration] : no accessory muscle use [Auscultation Breath Sounds / Voice Sounds] : lungs were clear to auscultation bilaterally [Heart Rate And Rhythm] : heart rate was normal and rhythm regular [Heart Sounds] : normal S1 and S2 [Murmurs] : no murmurs [Bowel Sounds] : normal bowel sounds [Abdomen Soft] : soft [Abdomen Tenderness] : non-tender [No Palpable Adenopathy] : no palpable adenopathy [Skin Color & Pigmentation] : normal skin color and pigmentation [Sensation] : the sensory exam was normal to light touch and pinprick [Motor Exam] : the motor exam was normal [Oriented To Time, Place, And Person] : oriented to person, place, and time [Affect] : the affect was normal

## 2019-03-12 NOTE — DATA REVIEWED
[FreeTextEntry1] : 2/22 BCX NGTD\par 2/20 BCX Salmonella S to Cipro\par \par 2/15 CT:\par \par FINDINGS:\par \par CHEST: \par \par LUNGS AND LARGE AIRWAYS: Patent central airways. No pulmonary nodules.\par PLEURA: No pleural effusion.\par VESSELS: Within normal limits.\par HEART: Heart size is normal. No pericardial effusion.\par MEDIASTINUM AND NAZARIO: No lymphadenopathy.\par CHEST WALL AND LOWER NECK: Within normal limits.\par \par ABDOMEN AND PELVIS:\par \par LIVER: Within normal limits.\par BILE DUCTS: Normal caliber.\par GALLBLADDER: Within normal limits.\par SPLEEN: Within normal limits.\par PANCREAS: Within normal limits.\par ADRENALS: Within normal limits.\par KIDNEYS/URETERS: Horseshoe kidney. Heterogeneous nephrogram with \par scattered hypodense foci within the cortices raising concern for \par pyelonephritis. No renal stones or hydronephrosis.\par \par BLADDER: Within normal limits.\par REPRODUCTIVE ORGANS: Prostate within normal limits.\par \par BOWEL: No bowel obstruction. Appendix  is normal.\par PERITONEUM: No ascites. \par VESSELS:  Within normal limits.\par RETROPERITONEUM: No lymphadenopathy.  \par ABDOMINAL WALL: Within normal limits.\par BONES: Within normal limits.\par \par IMPRESSION: \par \par Horseshoe kidney. Heterogeneous nephrogram raising concern for \par pyelonephritis. Correlation with urinalysis is suggested..\par

## 2019-03-12 NOTE — ASSESSMENT
[FreeTextEntry1] : 27 yo M with no PMH with recent travel to [place] with episode of Salmonella bacteremia.\par Although not fully identified, likely Salmonella typhi or paratyphi based on epi\par Fevers resolved, improving clinically\par BCX cleared on 2/22\par Tolerating Cipro, planned for 2 week course\par Overall, Salmonella bacteremia, recent foreign travel, recent hospitalization\par - Cipro 500mg q 12 to complete 2 week course\par - Check CBC, CMP, BCX x2\par - Self monitor for signs fevers, diarrhea, abd pain\par - Patient to find PCP to evaluate for elevated BP\par - RTC PRN, call office PRN [Treatment Education] : treatment education [Disclosure of Diagnosis] : disclosure of diagnosis

## 2019-03-12 NOTE — HISTORY OF PRESENT ILLNESS
[FreeTextEntry1] : 27 yo M with recent travel to [place] presented to [facility] with febrile illness. Patient found to have Salmonella bacteremia, cleared on 2/22. Has been on Cipro since then. No fevers, no chills. No abd pain. No N/V/D. Feels markedly improved. No signs of allergic reaction. Otherwise has been doing well. Presents to ID clinic for follow up.

## 2019-03-18 LAB
BACTERIA BLD CULT: NORMAL
BACTERIA BLD CULT: NORMAL

## 2019-03-20 LAB
-  AMIKACIN: SIGNIFICANT CHANGE UP
-  AMPICILLIN/SULBACTAM: SIGNIFICANT CHANGE UP
-  AMPICILLIN: SIGNIFICANT CHANGE UP
-  AZTREONAM: SIGNIFICANT CHANGE UP
-  CEFAZOLIN: SIGNIFICANT CHANGE UP
-  CEFEPIME: SIGNIFICANT CHANGE UP
-  CEFOXITIN: SIGNIFICANT CHANGE UP
-  CEFTAZIDIME: SIGNIFICANT CHANGE UP
-  CEFTRIAXONE: SIGNIFICANT CHANGE UP
-  CEFUROXIME: SIGNIFICANT CHANGE UP
-  CIPROFLOXACIN: SIGNIFICANT CHANGE UP
-  ERTAPENEM: SIGNIFICANT CHANGE UP
-  GENTAMICIN: SIGNIFICANT CHANGE UP
-  IMIPENEM: SIGNIFICANT CHANGE UP
-  LEVOFLOXACIN: SIGNIFICANT CHANGE UP
-  MEROPENEM: SIGNIFICANT CHANGE UP
-  NITROFURANTOIN: SIGNIFICANT CHANGE UP
-  PIPERACILLIN/TAZOBACTAM: SIGNIFICANT CHANGE UP
-  TIGECYCLINE: SIGNIFICANT CHANGE UP
-  TOBRAMYCIN: SIGNIFICANT CHANGE UP
-  TRIMETHOPRIM/SULFAMETHOXAZOLE: SIGNIFICANT CHANGE UP
BACTERIA UR CULT: SIGNIFICANT CHANGE UP

## 2019-11-27 NOTE — PROVIDER CONTACT NOTE (OTHER) - SITUATION
No answer on dtc line or pt line, will call back.  Therapy plan and dtc appt cancelled.    patient received first dose of new antibiotic polymyin. patient felt slight numbness and tingling in hands and lips.

## 2020-12-24 NOTE — PATIENT PROFILE ADULT - NSPROGENPREVTRANSF_GEN_A_NUR
Quality 128: Preventive Care And Screening: Body Mass Index (Bmi) Screening And Follow-Up Plan: BMI not documented, reason not otherwise specified. Quality 130: Documentation Of Current Medications In The Medical Record: Current Medications Documented Quality 226: Preventive Care And Screening: Tobacco Use: Screening And Cessation Intervention: Patient screened for tobacco use and is an ex/non-smoker Quality 431: Preventive Care And Screening: Unhealthy Alcohol Use - Screening: Patient screened for unhealthy alcohol use using a single question and scores less than 2 times per year Quality 110: Preventive Care And Screening: Influenza Immunization: Influenza Immunization previously received during influenza season Detail Level: Detailed no

## 2022-09-14 NOTE — DISCHARGE NOTE ADULT - FUNCTIONAL SCREEN CURRENT LEVEL: AMBULATION, MLM
Total Volume Injected (Ccs- Only Use Numbers And Decimals): 0.8 Consent: The risks of atrophy were reviewed with the patient. Include Z78.9 (Other Specified Conditions Influencing Health Status) As An Associated Diagnosis?: No Validate Note Data When Using Inventory: Yes Kenalog Preparation: Kenalog Concentration Of Solution Injected (Mg/Ml): 40.0 Treatment Number (Optional): 6 Medical Necessity Clause: This procedure was medically necessary because the lesions that were treated were: Detail Level: Detailed Administered By (Optional): Doug Cantu PA-C 0 = independent Which Kenalog Vial Was Used?: Kenalog 40 mg/ml (10 ml vial) How Many Mls Were Removed From The 40 Mg/Ml (10ml) Vial When Preparing The Injectable Solution?: 1.5 How Many Mls Were Removed From The 40 Mg/Ml (1ml) Vial When Preparing The Injectable Solution?: 0

## 2023-04-03 NOTE — PROVIDER CONTACT NOTE (OTHER) - SITUATION
patient has polymixn running at this time patient states he is feeling numbness in his face and burning in his IV hair removal not indicated

## 2023-12-05 NOTE — PROVIDER CONTACT NOTE (OTHER) - BACKGROUND
We decided to try Astepro and Flonase instead of montelukast and Xyzal since you haven't noticed a change.    Patient admitted for fever.

## 2024-03-25 NOTE — PROVIDER CONTACT NOTE (OTHER) - NAME OF MD/NP/PA/DO NOTIFIED:
PROCEDURE: ROUTINE INPATIENT EEG  NAME:   Juarez Quiles  ACCOUNT NUMBER : [id number]  MRN:   [id number]  DATE OF SERVICE: 3/25/2024     HISTORY/INDICATION: Pt is an 81yo male with witnessed syncope, with h/o vasovagal syncope as well as known epilepsy.  EEG was ordered by the admitting hospitalist for unclear reasons.     MEDICATIONS:   Current Facility-Administered Medications   Medication Dose Route Frequency Provider Last Rate Last Admin    atorvastatin (LIPITOR) tablet 20 mg  20 mg Oral Daily Anna Sanchez MD   20 mg at 03/25/24 0855    sodium chloride flush 0.9 % injection 5-40 mL  5-40 mL IntraVENous 2 times per day Anna Sanchez MD        sodium chloride flush 0.9 % injection 5-40 mL  5-40 mL IntraVENous PRN Anna Sanchez MD        0.9 % sodium chloride infusion   IntraVENous PRN Anna Sanchez MD        potassium chloride (KLOR-CON) extended release tablet 40 mEq  40 mEq Oral PRN Anna Sanchez MD        Or    potassium bicarb-citric acid (EFFER-K) effervescent tablet 40 mEq  40 mEq Oral PRN Anna Sanchez MD        Or    potassium chloride 10 mEq/100 mL IVPB (Peripheral Line)  10 mEq IntraVENous PRN Anna Sanchez MD        magnesium sulfate 2000 mg in 50 mL IVPB premix  2,000 mg IntraVENous PRN Anna Sanchez MD        enoxaparin (LOVENOX) injection 40 mg  40 mg SubCUTAneous Daily Anna Sanchez MD   40 mg at 03/25/24 0855    ondansetron (ZOFRAN-ODT) disintegrating tablet 4 mg  4 mg Oral Q8H PRN Anna Sanchez MD        Or    ondansetron (ZOFRAN) injection 4 mg  4 mg IntraVENous Q6H PRN Anna Sanchez MD        polyethylene glycol (GLYCOLAX) packet 17 g  17 g Oral Daily PRN Anna Sanchez MD        acetaminophen (TYLENOL) tablet 650 mg  650 mg Oral Q6H PRN Anna Sanchez MD        Or    acetaminophen (TYLENOL) suppository 650 mg  650 mg Rectal Q6H PRN Anna Sanchez MD        0.9 % sodium chloride infusion   IntraVENous Continuous Anna Sanchez MD 75  Dr. Avelar

## 2025-03-27 NOTE — PATIENT PROFILE ADULT - REASON FOR REFUSAL (REFER PATIENT TO HEALTHCARE PROVIDER FOR FOLLOW-UP):
Detail Level: Detailed Depth Of Biopsy: dermis Was A Bandage Applied: Yes Size Of Lesion In Cm: 0 Biopsy Type: H and E Biopsy Method: Dermablade Anesthesia Type: 1% lidocaine with 1:100,000 epinephrine and a 1:12 solution of 8.4% sodium bicarbonate Anesthesia Volume In Cc: 0.2 Hemostasis: Drysol Wound Care: Petrolatum Dressing: bandage Destruction After The Procedure: No Type Of Destruction Used: Silver Nitrate Curettage Text: The wound bed was treated with curettage after the biopsy was performed. Cryotherapy Text: The wound bed was treated with cryotherapy after the biopsy was performed. Electrodesiccation Text: The wound bed was treated with electrodesiccation after the biopsy was performed. Electrodesiccation And Curettage Text: The wound bed was treated with electrodesiccation and curettage after the biopsy was performed. Silver Nitrate Text: The wound bed was treated with silver nitrate after the biopsy was performed. Lab: 6 Medical Necessity Information: It is in your best interest to select a reason for this procedure from the list below. All of these items fulfill various CMS LCD requirements except the new and changing color options. Consent: Written consent was obtained and risks were reviewed including but not limited to scarring, infection, bleeding, scabbing, incomplete removal, nerve damage and allergy to anesthesia. Post-Care Instructions: I reviewed with the patient in detail post-care instructions. Patient is to keep the biopsy site dry overnight, and then apply bacitracin twice daily until healed. Patient may apply hydrogen peroxide soaks to remove any crusting. Notification Instructions: Patient will be notified of biopsy results. However, patient instructed to call the office if not contacted within 2 weeks. Billing Type: Third-Party Bill Information: Selecting Yes will display possible errors in your note based on the variables you have selected. This validation is only offered as a suggestion for you. PLEASE NOTE THAT THE VALIDATION TEXT WILL BE REMOVED WHEN YOU FINALIZE YOUR NOTE. IF YOU WANT TO FAX A PRELIMINARY NOTE YOU WILL NEED TO TOGGLE THIS TO 'NO' IF YOU DO NOT WANT IT IN YOUR FAXED NOTE. will follow up with PMD